# Patient Record
Sex: MALE | Race: WHITE | NOT HISPANIC OR LATINO | Employment: FULL TIME | URBAN - METROPOLITAN AREA
[De-identification: names, ages, dates, MRNs, and addresses within clinical notes are randomized per-mention and may not be internally consistent; named-entity substitution may affect disease eponyms.]

---

## 2017-05-03 ENCOUNTER — OFFICE VISIT (OUTPATIENT)
Dept: URGENT CARE | Facility: CLINIC | Age: 42
End: 2017-05-03
Payer: COMMERCIAL

## 2017-05-03 VITALS
DIASTOLIC BLOOD PRESSURE: 70 MMHG | WEIGHT: 173 LBS | HEART RATE: 78 BPM | RESPIRATION RATE: 20 BRPM | SYSTOLIC BLOOD PRESSURE: 110 MMHG | TEMPERATURE: 98.4 F | HEIGHT: 71 IN | OXYGEN SATURATION: 98 % | BODY MASS INDEX: 24.22 KG/M2

## 2017-05-03 DIAGNOSIS — J06.9 VIRAL UPPER RESPIRATORY ILLNESS: ICD-10-CM

## 2017-05-03 DIAGNOSIS — H81.12 BPPV (BENIGN PAROXYSMAL POSITIONAL VERTIGO), LEFT: ICD-10-CM

## 2017-05-03 PROCEDURE — 99203 OFFICE O/P NEW LOW 30 MIN: CPT | Performed by: FAMILY MEDICINE

## 2017-05-03 ASSESSMENT — ENCOUNTER SYMPTOMS
DOUBLE VISION: 0
FEVER: 0
COUGH: 1
BLURRED VISION: 0
HOARSE VOICE: 0
NAUSEA: 0
SWOLLEN GLANDS: 0
CHILLS: 0
NECK PAIN: 0
DIAPHORESIS: 0
SHORTNESS OF BREATH: 0
DIZZINESS: 1
HEADACHES: 0
VOMITING: 0
SORE THROAT: 1
SINUS PRESSURE: 1

## 2017-05-03 NOTE — MR AVS SNAPSHOT
"        Pj Rhodes   5/3/2017 8:15 AM   Office Visit   MRN: [id number]    Department:  [facility]   Dept Phone:  [phone]    Description:  Male : 1975   Provider:  Roberto Gold M.D.           Reason for Visit     Sinus Problem Almost a week stuffy nose, couple days loss of balance      Allergies as of 5/3/2017     No Known Allergies      You were diagnosed with     BPPV (benign paroxysmal positional vertigo), left   [1200458]       Viral upper respiratory illness   [426988]         Vital Signs     Blood Pressure Pulse Temperature Respirations Height Weight    110/70 mmHg 78 36.9 °C (98.4 °F) 20 1.803 m (5' 11\") 78.472 kg (173 lb)    Body Mass Index Oxygen Saturation Smoking Status             24.14 kg/m2 98% Never Smoker          Basic Information     Date Of Birth Sex Race Ethnicity Preferred Language    1975 Male White Non- English      Health Maintenance        Date Due Completion Dates    IMM DTaP/Tdap/Td Vaccine (2 - Td) 2018            Current Immunizations     Influenza Vaccine Quad Inj (Pf) 2013    Tdap Vaccine 2008      Below and/or attached are the medications your provider expects you to take. Review all of your home medications and newly ordered medications with your provider and/or pharmacist. Follow medication instructions as directed by your provider and/or pharmacist. Please keep your medication list with you and share with your provider. Update the information when medications are discontinued, doses are changed, or new medications (including over-the-counter products) are added; and carry medication information at all times in the event of emergency situations     Allergies:  No Known Allergies          Medications  Valid as of: May 03, 2017 -  9:25 AM    Generic Name Brand Name Tablet Size Instructions for use    .                 Medicines prescribed today were sent to:     [facility]/PHARMACY #[id number] - [place], [place] - [address]    [address] [place] [address]    Phone: [phone] Fax: [phone]    Open 24 Hours?: No      Medication refill instructions:       If your prescription bottle indicates you have medication refills left, it is not necessary to call your provider’s office. Please contact your pharmacy and they will refill your medication.    If your prescription bottle indicates you do not have any refills left, you may request refills at any time through one of the following ways: The online [employer] system (except Urgent Care), by calling your provider’s office, or by asking your pharmacy to contact your provider’s office with a refill request. Medication refills are processed only during regular business hours and may not be available until the next business day. Your provider may request additional information or to have a follow-up visit with you prior to refilling your medication.   *Please Note: Medication refills are assigned a new Rx number when refilled electronically. Your pharmacy may indicate that no refills were authorized even though a new prescription for the same medication is available at the pharmacy. Please request the medicine by name with the pharmacy before contacting your provider for a refill.        Instructions    Benign Positional Vertigo  Vertigo means you feel like you or your surroundings are moving when they are not. Benign positional vertigo is the most common form of vertigo. Benign means that the cause of your condition is not serious. Benign positional vertigo is more common in older adults.  CAUSES   Benign positional vertigo is the result of an upset in the labyrinth system. This is an area in the middle ear that helps control your balance. This may be caused by a viral infection, head injury, or repetitive motion. However, often no specific cause is found.  SYMPTOMS   Symptoms of benign positional vertigo occur when you move your head or eyes in different directions. Some of the symptoms  may include:  · Loss of balance and falls.  · Vomiting.  · Blurred vision.  · Dizziness.  · Nausea.  · Involuntary eye movements (nystagmus).  DIAGNOSIS   Benign positional vertigo is usually diagnosed by physical exam. If the specific cause of your benign positional vertigo is unknown, your caregiver may perform imaging tests, such as magnetic resonance imaging (MRI) or computed tomography (CT).  TREATMENT   Your caregiver may recommend movements or procedures to correct the benign positional vertigo. Medicines such as meclizine, benzodiazepines, and medicines for nausea may be used to treat your symptoms. In rare cases, if your symptoms are caused by certain conditions that affect the inner ear, you may need surgery.  HOME CARE INSTRUCTIONS   · Follow your caregiver's instructions.  · Move slowly. Do not make sudden body or head movements.  · Avoid driving.  · Avoid operating heavy machinery.  · Avoid performing any tasks that would be dangerous to you or others during a vertigo episode.  · Drink enough fluids to keep your urine clear or pale yellow.  SEEK IMMEDIATE MEDICAL CARE IF:   · You develop problems with walking, weakness, numbness, or using your arms, hands, or legs.  · You have difficulty speaking.  · You develop severe headaches.  · Your nausea or vomiting continues or gets worse.  · You develop visual changes.  · Your family or friends notice any behavioral changes.  · Your condition gets worse.  · You have a fever.  · You develop a stiff neck or sensitivity to light.  MAKE SURE YOU:   · Understand these instructions.  · Will watch your condition.  · Will get help right away if you are not doing well or get worse.     This information is not intended to replace advice given to you by your health care provider. Make sure you discuss any questions you have with your health care provider.     Document Released: 09/25/2007 Document Revised: 03/11/2013 Document Reviewed: 04/11/2016  MamaBear App  Patient Education ©2016 Elsevier Inc.            [employer] Access Code: [email or web address]  Expires: 6/2/2017  9:25 AM    [employer]  A secure, online tool to manage your health information     [employer]’s [employer]® is a secure, online tool that connects you to your personalized health information from the privacy of your home -- day or night - making it very easy for you to manage your healthcare. Once the activation process is completed, you can even access your medical information using the [employer] rashaun, which is available for free in the Apple Rashaun store or Google Play store.     [employer] provides the following levels of access (as shown below):   My Chart Features   [facility] Doctor [facility]  Specialists [facility]  Urgent  Care Non-[facility]  Primary Care  Doctor   Email your healthcare team securely and privately 24/7 X X X    Manage appointments: schedule your next appointment; view details of past/upcoming appointments X      Request prescription refills. X      View recent personal medical records, including lab and immunizations X X X X   View health record, including health history, allergies, medications X X X X   Read reports about your outpatient visits, procedures, consult and ER notes X X X X   See your discharge summary, which is a recap of your hospital and/or ER visit that includes your diagnosis, lab results, and care plan. X X       How to register for [employer]:  1. Go to  [email or web address].  2. Click on the Sign Up Now box, which takes you to the New Member Sign Up page. You will need to provide the following information:  a. Enter your [employer] Access Code exactly as it appears at the top of this page. (You will not need to use this code after you’ve completed the sign-up process. If you do not sign up before the expiration date, you must request a new code.)   b. Enter your date of birth.   c. Enter your home email address.   d. Click Submit, and follow the next screen’s  instructions.  3. Create a [employer] ID. This will be your [employer] login ID and cannot be changed, so think of one that is secure and easy to remember.  4. Create a [employer] password. You can change your password at any time.  5. Enter your Password Reset Question and Answer. This can be used at a later time if you forget your password.   6. Enter your e-mail address. This allows you to receive e-mail notifications when new information is available in uiu.  7. Click Sign Up. You can now view your health information.    For assistance activating your uiu account, call [phone]

## 2017-05-03 NOTE — PATIENT INSTRUCTIONS
Benign Positional Vertigo  Vertigo means you feel like you or your surroundings are moving when they are not. Benign positional vertigo is the most common form of vertigo. Benign means that the cause of your condition is not serious. Benign positional vertigo is more common in older adults.  CAUSES   Benign positional vertigo is the result of an upset in the labyrinth system. This is an area in the middle ear that helps control your balance. This may be caused by a viral infection, head injury, or repetitive motion. However, often no specific cause is found.  SYMPTOMS   Symptoms of benign positional vertigo occur when you move your head or eyes in different directions. Some of the symptoms may include:  · Loss of balance and falls.  · Vomiting.  · Blurred vision.  · Dizziness.  · Nausea.  · Involuntary eye movements (nystagmus).  DIAGNOSIS   Benign positional vertigo is usually diagnosed by physical exam. If the specific cause of your benign positional vertigo is unknown, your caregiver may perform imaging tests, such as magnetic resonance imaging (MRI) or computed tomography (CT).  TREATMENT   Your caregiver may recommend movements or procedures to correct the benign positional vertigo. Medicines such as meclizine, benzodiazepines, and medicines for nausea may be used to treat your symptoms. In rare cases, if your symptoms are caused by certain conditions that affect the inner ear, you may need surgery.  HOME CARE INSTRUCTIONS   · Follow your caregiver's instructions.  · Move slowly. Do not make sudden body or head movements.  · Avoid driving.  · Avoid operating heavy machinery.  · Avoid performing any tasks that would be dangerous to you or others during a vertigo episode.  · Drink enough fluids to keep your urine clear or pale yellow.  SEEK IMMEDIATE MEDICAL CARE IF:   · You develop problems with walking, weakness, numbness, or using your arms, hands, or legs.  · You have difficulty speaking.  · You develop  severe headaches.  · Your nausea or vomiting continues or gets worse.  · You develop visual changes.  · Your family or friends notice any behavioral changes.  · Your condition gets worse.  · You have a fever.  · You develop a stiff neck or sensitivity to light.  MAKE SURE YOU:   · Understand these instructions.  · Will watch your condition.  · Will get help right away if you are not doing well or get worse.     This information is not intended to replace advice given to you by your health care provider. Make sure you discuss any questions you have with your health care provider.     Document Released: 09/25/2007 Document Revised: 03/11/2013 Document Reviewed: 04/11/2016  [employer] Interactive Patient Education ©2016 Elsevier Inc.

## 2017-05-03 NOTE — PROGRESS NOTES
"Subjective:      Pj Rhodes is a 41 y.o. male who presents with Sinus Problem            Sinus Problem  This is a new problem. The current episode started in the past 7 days. The problem has been gradually worsening since onset. There has been no fever. He is experiencing no pain (mostly pressure). Associated symptoms include congestion, coughing, sinus pressure and a sore throat. Pertinent negatives include no chills, diaphoresis, ear pain, headaches, hoarse voice, neck pain, shortness of breath, sneezing or swollen glands. Treatments tried: mucinex, allegra, excedrin. The treatment provided no relief.       Review of Systems   Constitutional: Negative for fever, chills and diaphoresis.   HENT: Positive for congestion, sinus pressure and sore throat. Negative for ear pain, hoarse voice and sneezing.    Eyes: Negative for blurred vision and double vision.   Respiratory: Positive for cough. Negative for shortness of breath.    Cardiovascular: Negative for chest pain.   Gastrointestinal: Negative for nausea and vomiting.   Genitourinary: Negative for dysuria.   Musculoskeletal: Negative for neck pain.   Neurological: Positive for dizziness. Negative for headaches.     PMH:  has a past medical history of Vertigo.  MEDS: No current outpatient prescriptions on file.  ALLERGIES: No Known Allergies  SURGHX: History reviewed. No pertinent past surgical history.  SOCHX:  reports that he has never smoked. He does not have any smokeless tobacco history on file. He reports that he drinks alcohol. He reports that he does not use illicit drugs.  FH: Family history was reviewed, no pertinent findings to report       Objective:     /70 mmHg  Pulse 78  Temp(Src) 36.9 °C (98.4 °F)  Resp 20  Ht 1.803 m (5' 11\")  Wt 78.472 kg (173 lb)  BMI 24.14 kg/m2  SpO2 98%     Physical Exam   Constitutional: He appears well-developed.   HENT:   Head: Normocephalic.   Right Ear: External ear normal.   Left Ear: External ear " normal.   Mouth/Throat: Oropharyngeal exudate present.   Nasal congestion    Eyes: Right eye exhibits no discharge. Left eye exhibits no discharge.   Neck: Normal range of motion. No tracheal deviation present. No thyromegaly present.   Cardiovascular: Normal rate.  Exam reveals no friction rub.    No murmur heard.  Pulmonary/Chest: Effort normal. No stridor. No respiratory distress. He has no wheezes.   Abdominal: Soft. He exhibits no distension. There is no tenderness. There is no guarding.   Lymphadenopathy:     He has no cervical adenopathy.   Neurological: He is alert. He displays no tremor. He exhibits normal muscle tone. He displays a negative Romberg sign. He displays no seizure activity. Coordination and gait normal. GCS eye subscore is 4. GCS verbal subscore is 5. GCS motor subscore is 6.   POSITIVE Tamiment-Hallpike to LEFT   Skin: Skin is warm and dry. He is not diaphoretic.   Psychiatric: He has a normal mood and affect. His behavior is normal.               Assessment/Plan:     1. BPPV (benign paroxysmal positional vertigo), left     2. Viral upper respiratory illness       Supportive care  Push fluids  Monitor temperature  Follow-up if symptoms worsen or fail to improve    BPPV exercised discussed and handout provided  Offered meclizine, but patient declined at this time saying it hasn't worked for him

## 2017-05-22 ENCOUNTER — OFFICE VISIT (OUTPATIENT)
Dept: MEDICAL GROUP | Facility: MEDICAL CENTER | Age: 42
End: 2017-05-22
Payer: COMMERCIAL

## 2017-05-22 VITALS
WEIGHT: 177 LBS | OXYGEN SATURATION: 95 % | HEART RATE: 62 BPM | HEIGHT: 71 IN | SYSTOLIC BLOOD PRESSURE: 124 MMHG | TEMPERATURE: 98.9 F | DIASTOLIC BLOOD PRESSURE: 70 MMHG | BODY MASS INDEX: 24.78 KG/M2

## 2017-05-22 DIAGNOSIS — G43.109 MIGRAINE WITH AURA AND WITHOUT STATUS MIGRAINOSUS, NOT INTRACTABLE: ICD-10-CM

## 2017-05-22 DIAGNOSIS — Z00.00 ROUTINE GENERAL MEDICAL EXAMINATION AT A HEALTH CARE FACILITY: ICD-10-CM

## 2017-05-22 DIAGNOSIS — R35.1 NOCTURIA: ICD-10-CM

## 2017-05-22 DIAGNOSIS — R05.9 COUGH: ICD-10-CM

## 2017-05-22 PROCEDURE — 99204 OFFICE O/P NEW MOD 45 MIN: CPT | Performed by: FAMILY MEDICINE

## 2017-05-22 ASSESSMENT — PATIENT HEALTH QUESTIONNAIRE - PHQ9: CLINICAL INTERPRETATION OF PHQ2 SCORE: 0

## 2017-05-22 NOTE — MR AVS SNAPSHOT
"        Pj DUNBAR Meagan   2017 3:40 PM   Office Visit   MRN: [id number]    Department:  [place] [address]   Dept Phone:  [phone]    Description:  Male : 1975   Provider:  Dejuan Trujillo M.D.           Reason for Visit     Establish Care     Allergic Reaction cough, sore throat     Dizziness 1 month       Allergies as of 2017     Allergen Noted Reactions    Contrast Media With Iodine [Iodine] 2017   Swelling      You were diagnosed with     Migraine with aura and without status migrainosus, not intractable   [968229]       Nocturia   [788.43.ICD-9-CM]       Routine general medical examination at a health care facility   [V70.0.ICD-9-CM]         Vital Signs     Blood Pressure Pulse Temperature Height Weight Body Mass Index    124/70 mmHg 62 37.2 °C (98.9 °F) 1.803 m (5' 11\") 80.287 kg (177 lb) 24.70 kg/m2    Oxygen Saturation Smoking Status                95% Never Smoker           Basic Information     Date Of Birth Sex Race Ethnicity Preferred Language    1975 Male White Non- English      Your appointments     2017  7:00 AM   Established Patient with Dejuan Trujillo M.D.   [facility]    [address]  [address]  [facility] [address] [phone]           You will be receiving a confirmation call a few days before your appointment from our automated call confirmation system.              Problem List              ICD-10-CM Priority Class Noted - Resolved    Migraine with aura and without status migrainosus, not intractable G43.109   2017 - Present    Nocturia R35.1   2017 - Present      Health Maintenance        Date Due Completion Dates    IMM DTaP/Tdap/Td Vaccine (2 - Td) 2018            Current Immunizations     Influenza Vaccine Quad Inj (Pf) 2013    Tdap Vaccine 2008      Below and/or attached are the medications your provider expects you to take. Review all of your home " medications and newly ordered medications with your provider and/or pharmacist. Follow medication instructions as directed by your provider and/or pharmacist. Please keep your medication list with you and share with your provider. Update the information when medications are discontinued, doses are changed, or new medications (including over-the-counter products) are added; and carry medication information at all times in the event of emergency situations     Allergies:  CONTRAST MEDIA WITH IODINE - Swelling               Medications  Valid as of: May 22, 2017 -  4:32 PM    Generic Name Brand Name Tablet Size Instructions for use    .                 Medicines prescribed today were sent to:     [facility]/PHARMACY #[id number] - [place], [place] - [address]    [address] [place] [address]    Phone: [phone] Fax: [phone]    Open 24 Hours?: No      Medication refill instructions:       If your prescription bottle indicates you have medication refills left, it is not necessary to call your provider’s office. Please contact your pharmacy and they will refill your medication.    If your prescription bottle indicates you do not have any refills left, you may request refills at any time through one of the following ways: The online [employer] system (except Urgent Care), by calling your provider’s office, or by asking your pharmacy to contact your provider’s office with a refill request. Medication refills are processed only during regular business hours and may not be available until the next business day. Your provider may request additional information or to have a follow-up visit with you prior to refilling your medication.   *Please Note: Medication refills are assigned a new Rx number when refilled electronically. Your pharmacy may indicate that no refills were authorized even though a new prescription for the same medication is available at the pharmacy. Please request the medicine by name with the pharmacy before  contacting your provider for a refill.        Your To Do List     Future Labs/Procedures Complete By Expires    COMP METABOLIC PANEL  As directed 5/22/2018    HEMOGLOBIN A1C  As directed 5/22/2018    LIPID PROFILE  As directed 5/22/2018    Comments:    Fasting at least 8hours    PROSTATE SPECIFIC AG SCREENING  As directed 5/22/2018         [employer] Access Code: [email or web address]  Expires: 6/2/2017  9:25 AM    [employer]  A secure, online tool to manage your health information     [employer]’s [employer]® is a secure, online tool that connects you to your personalized health information from the privacy of your home -- day or night - making it very easy for you to manage your healthcare. Once the activation process is completed, you can even access your medical information using the [employer] rashaun, which is available for free in the Apple Rashaun store or Google Play store.     [employer] provides the following levels of access (as shown below):   My Chart Features   [facility] Doctor [facility]  Specialists [facility]  Urgent  Care [facility]  Primary Care  Doctor   Email your healthcare team securely and privately 24/7 X X X    Manage appointments: schedule your next appointment; view details of past/upcoming appointments X      Request prescription refills. X      View recent personal medical records, including lab and immunizations X X X X   View health record, including health history, allergies, medications X X X X   Read reports about your outpatient visits, procedures, consult and ER notes X X X X   See your discharge summary, which is a recap of your hospital and/or ER visit that includes your diagnosis, lab results, and care plan. X X       How to register for [employer]:  1. Go to  [email or web address].  2. Click on the Sign Up Now box, which takes you to the New Member Sign Up page. You will need to provide the following information:  a. Enter your [employer] Access Code exactly as it appears at the top of this page.  (You will not need to use this code after you’ve completed the sign-up process. If you do not sign up before the expiration date, you must request a new code.)   b. Enter your date of birth.   c. Enter your home email address.   d. Click Submit, and follow the next screen’s instructions.  3. Create a [employer] ID. This will be your [employer] login ID and cannot be changed, so think of one that is secure and easy to remember.  4. Create a [employer] password. You can change your password at any time.  5. Enter your Password Reset Question and Answer. This can be used at a later time if you forget your password.   6. Enter your e-mail address. This allows you to receive e-mail notifications when new information is available in [employer].  7. Click Sign Up. You can now view your health information.    For assistance activating your [employer] account, call [phone]

## 2017-05-23 PROBLEM — R05.9 COUGH: Status: ACTIVE | Noted: 2017-05-23

## 2017-05-24 ENCOUNTER — HOSPITAL ENCOUNTER (OUTPATIENT)
Dept: LAB | Facility: MEDICAL CENTER | Age: 42
End: 2017-05-24
Attending: FAMILY MEDICINE
Payer: COMMERCIAL

## 2017-05-24 DIAGNOSIS — R35.1 NOCTURIA: ICD-10-CM

## 2017-05-24 DIAGNOSIS — Z00.00 ROUTINE GENERAL MEDICAL EXAMINATION AT A HEALTH CARE FACILITY: ICD-10-CM

## 2017-05-24 LAB
ALBUMIN SERPL BCP-MCNC: 4.2 G/DL (ref 3.2–4.9)
ALBUMIN/GLOB SERPL: 1.6 G/DL
ALP SERPL-CCNC: 41 U/L (ref 30–99)
ALT SERPL-CCNC: 14 U/L (ref 2–50)
ANION GAP SERPL CALC-SCNC: 7 MMOL/L (ref 0–11.9)
AST SERPL-CCNC: 17 U/L (ref 12–45)
BILIRUB SERPL-MCNC: 0.7 MG/DL (ref 0.1–1.5)
BUN SERPL-MCNC: 18 MG/DL (ref 8–22)
CALCIUM SERPL-MCNC: 9.3 MG/DL (ref 8.5–10.5)
CHLORIDE SERPL-SCNC: 106 MMOL/L (ref 96–112)
CHOLEST SERPL-MCNC: 179 MG/DL (ref 100–199)
CO2 SERPL-SCNC: 25 MMOL/L (ref 20–33)
CREAT SERPL-MCNC: 1.13 MG/DL (ref 0.5–1.4)
EST. AVERAGE GLUCOSE BLD GHB EST-MCNC: 100 MG/DL
GFR SERPL CREATININE-BSD FRML MDRD: >60 ML/MIN/1.73 M 2
GLOBULIN SER CALC-MCNC: 2.6 G/DL (ref 1.9–3.5)
GLUCOSE SERPL-MCNC: 85 MG/DL (ref 65–99)
HBA1C MFR BLD: 5.1 % (ref 0–5.6)
HDLC SERPL-MCNC: 39 MG/DL
LDLC SERPL CALC-MCNC: 122 MG/DL
POTASSIUM SERPL-SCNC: 4.2 MMOL/L (ref 3.6–5.5)
PROT SERPL-MCNC: 6.8 G/DL (ref 6–8.2)
PSA SERPL-MCNC: 0.63 NG/ML (ref 0–4)
SODIUM SERPL-SCNC: 138 MMOL/L (ref 135–145)
TRIGL SERPL-MCNC: 89 MG/DL (ref 0–149)

## 2017-05-24 PROCEDURE — 80053 COMPREHEN METABOLIC PANEL: CPT

## 2017-05-24 PROCEDURE — 80061 LIPID PANEL: CPT

## 2017-05-24 PROCEDURE — 83036 HEMOGLOBIN GLYCOSYLATED A1C: CPT

## 2017-05-24 PROCEDURE — 36415 COLL VENOUS BLD VENIPUNCTURE: CPT

## 2017-05-24 PROCEDURE — 84153 ASSAY OF PSA TOTAL: CPT

## 2017-05-24 NOTE — ASSESSMENT & PLAN NOTE
"Patient states that he infrequently gets migraines with auras.  Migraines are preceded by auras, then proceed to unilateral (or sometimes bilateral) pounding headache that \"makes me want to curl up in bed, turn off the lights, and sleep it off.\"  Patient states that this occurs once per year, and that he hasn't tried medication like Imitrex in the past.    ROS is NEGATIVE for confusion, altered mentation, word finding difficulty, memory loss, facial droop, dysarthria, dysphagia, hemiplegia, gait instability.  "

## 2017-05-24 NOTE — ASSESSMENT & PLAN NOTE
"Patient states that he has a cough that he describes as a \"stinger\" in his throat, otherwise is negative for any other s/sx of illness or allergies.    ROS is NEGATIVE for fevers, chills, bilateral ear congestion/pain, sinus headaches, tender cervical lymph nodes, dysphagia, tachypnea, dyspnea, chest congestion, wheezing/rhonchi/rales, nausea, emesis, loose stools/diarrhea, myalgias, rash.    ROS is NEGATIVE  for increased lacrimation, b/l itchy eyes,  rhinorrhea, post-nasal drip, sneezing, wheezing, dyspnea, respiratory distress, palpitations, tachycardia, rash, generalized pruritis, rash/hives.  "

## 2017-05-24 NOTE — ASSESSMENT & PLAN NOTE
Patient states that he has nocturia that occurs once to twice nightly.  Otherwise, no other concerning  s/sx.    No FMH positive for prostate cancer    ROS is NEGATIVE for polyuria, increased frequency of urination, urinary hesitancy, feeling of incomplete voiding, difficulty initiation urine stream, hematuria, pyuria, erectile dysfunction

## 2017-05-24 NOTE — PROGRESS NOTES
"Subjective:     Chief Complaint   Patient presents with   • Establish Care   • Allergic Reaction     cough, sore throat    • Dizziness     1 month        History of Present Illness:  Pj Rhodes is a 41 y.o. male patient new to [facility] who presents today to establish primary medical care as well as discuss cough.  Dizziness has resolved.  PMH, PSH, Social History, Medications, Allergies, FMH all reviewed as documented:    Migraine with aura and without status migrainosus, not intractable  Patient states that he infrequently gets migraines with auras.  Migraines are preceded by auras, then proceed to unilateral (or sometimes bilateral) pounding headache that \"makes me want to curl up in bed, turn off the lights, and sleep it off.\"  Patient states that this occurs once per year, and that he hasn't tried medication like Imitrex in the past.    ROS is NEGATIVE for confusion, altered mentation, word finding difficulty, memory loss, facial droop, dysarthria, dysphagia, hemiplegia, gait instability.    Nocturia  Patient states that he has nocturia that occurs once to twice nightly.  Otherwise, no other concerning  s/sx.    No FMH positive for prostate cancer    ROS is NEGATIVE for polyuria, increased frequency of urination, urinary hesitancy, feeling of incomplete voiding, difficulty initiation urine stream, hematuria, pyuria, erectile dysfunction    Cough  Patient states that he has a cough that he describes as a \"stinger\" in his throat, otherwise is negative for any other s/sx of illness or allergies.    ROS is NEGATIVE for fevers, chills, bilateral ear congestion/pain, sinus headaches, tender cervical lymph nodes, dysphagia, tachypnea, dyspnea, chest congestion, wheezing/rhonchi/rales, nausea, emesis, loose stools/diarrhea, myalgias, rash.    ROS is NEGATIVE  for increased lacrimation, b/l itchy eyes,  rhinorrhea, post-nasal drip, sneezing, wheezing, dyspnea, respiratory distress, palpitations, tachycardia, rash, " generalized pruritis, rash/hives.      Patient Active Problem List    Diagnosis Date Noted   • Cough 2017   • Migraine with aura and without status migrainosus, not intractable 2017   • Nocturia 2017       Additional History:   Allergies:    Contrast media with iodine     Medications:     No current [facility]-ordered outpatient prescriptions on file.     No current [facility]-ordered facility-administered medications on file.        Past Medical History:     Past Medical History   Diagnosis Date   • Vertigo         Past Surgical History:     Past Surgical History   Procedure Laterality Date   • Eye surgery Left      Lasik   • Vasectomy  ?        Social History:     Social History   Substance Use Topics   • Smoking status: Never Smoker    • Smokeless tobacco: Never Used   • Alcohol Use: 2.4 oz/week     4 Cans of beer per week        Family History:     Family Status   Relation Status Death Age   • Mother Alive    • Father Alive    • Sister Alive    • Paternal Grandfather     • Maternal Grandmother     • Maternal Grandfather     • Paternal Grandmother          Family History   Problem Relation Age of Onset   • No Known Problems Mother    • No Known Problems Father    • No Known Problems Sister    • Dementia Paternal Grandfather    • Cancer Neg Hx        ROS:    - Constitutional: Negative for fever, chills, unexpected weight change, and fatigue/generalized weakness.     - HEENT: Negative for headaches, vision changes, hearing changes, ear pain, ear discharge, rhinorrhea, sinus congestion, sore throat, and neck pain.      - Respiratory: Negative for cough, sputum production, chest congestion, dyspnea, wheezing, and crackles.      - Cardiovascular: Negative for chest pain, palpitations, orthopnea, and bilateral lower extremity edema.     - Musculoskeletal: Negative for myalgias, back pain, and joint pain.     - Neurological: Negative for dizziness, tingling, tremors, focal  "sensory deficit, focal weakness and headaches.     - NOTE: All other systems reviewed and are negative, except as in HPI.     Objective:   Physical Exam:    Vitals: Blood pressure 124/70, pulse 62, temperature 37.2 °C (98.9 °F), height 1.803 m (5' 11\"), weight 80.287 kg (177 lb), SpO2 95 %.   BMI: Body mass index is 24.7 kg/(m^2).   General/Constitutional: Vitals as above, Well nourished, well developed male in no acute distress   Head/Eyes:  - Head is grossly normal & atraumatic  - Bilateral conjunctivae clear and not injected, bilateral EOMI, bilateral PERRL   ENT:   - Bilateral external ears grossly normal in appearance, external auditory canals clear & bilateral TMs visualized with appropriate cone of light reflex, hearing grossly intact  - External nares normal in appearance and without discharge/bleeding, bilateral turbinates non-erythematous/non-edematous and without discharge/bleeding  - Good dentition,  posterior oropharynx without erythema/edema/exudates  Neck: Neck supple, no masses, neck non-tender to palpation, no thyromegaly/goiter   Respiratory: No respiratory distress, bilateral lungs are clear to auscultation in all lung fields (anterior/lateral/posterior), no wheezing/rhonchi/rales   Cardiovascular: Regular rate and rhythm without murmur/gallops/rubs, distal pulses equal and 2+ bilaterally (radial, posterior tibial), no bilateral lower extremity edema   MSK: Gait grossly normal & not antalgic, no tenderness to percussion of vertebral processes, no CVAT, no bilateral SI joint tenderness   Integumentary: No apparent rashes   Neuro: Gross motor movement intact in all 4 extremities, Gross sensation intact to extremities and trunk, Gait grossly normal and not antalgic   Psych: Judgment grossly appropriate, no apparent depression/anxiety      Health Maintenance:     - I have requested previous records, and will update accordingly.    Imaging/Labs:     - I have requested previous records, and will update " accordingly.     Assessment and Plan:   1. Migraine with aura and without status migrainosus, not intractable  Stable, well-controlled.  Patient given information re: Imitrex.  Patient to consider having rx for Imitrex.    2. Nocturia  Uncontrolled.  PSA for screening.  Consider [facility] vs. Urology Referral vs. Watchful waiting.   - PROSTATE SPECIFIC AG SCREENING; Future    3. Cough  Uncontrolled but resolving.  Patient to keep a journal of his symptoms.    4. Routine general medical examination at a health care facility   - PROSTATE SPECIFIC AG SCREENING; Future   - HEMOGLOBIN A1C; Future   - LIPID PROFILE; Future   - COMP METABOLIC PANEL; Future      RTC: 2 weeks for labs follow-up.    PLEASE NOTE: This dictation was created using voice recognition software. I have made every reasonable attempt to correct obvious errors, but I expect that there are errors of grammar and possibly content that I did not discover before finalizing the note.

## 2017-06-07 ENCOUNTER — APPOINTMENT (OUTPATIENT)
Dept: MEDICAL GROUP | Facility: MEDICAL CENTER | Age: 42
End: 2017-06-07
Payer: COMMERCIAL

## 2017-06-14 ENCOUNTER — TELEPHONE (OUTPATIENT)
Dept: MEDICAL GROUP | Facility: MEDICAL CENTER | Age: 42
End: 2017-06-14

## 2017-06-14 NOTE — TELEPHONE ENCOUNTER
ESTABLISHED PATIENT PRE-VISIT PLANNING     Note: Patient will not be contacted if there is no indication to call.     1.  Reviewed notes from the last few office visits within the medical group: Yes    2.  If any orders were placed at last visit or intended to be done for this visit (i.e. 6 mos follow-up), do we have Results/Consult Notes?        •  Labs - Labs ordered, completed and results are in chart.       •  Imaging - Imaging was not ordered at last office visit.       •  Referrals - No referrals were ordered at last office visit.    3. Is this appointment scheduled as a Hospital Follow-Up? No    4.  Immunizations were updated in [employer] using WebIZ?: Yes       •  Web Iz Recommendations: FLU HEPATITIS A  MMR  TD VARICELLA (Chicken Pox)     5.  Patient is due for the following Health Maintenance Topics:   There are no preventive care reminders to display for this patient.        6.  Patient was NOT informed to arrive 15 min prior to their scheduled appointment and bring in their medication bottles.

## 2017-06-15 ENCOUNTER — OFFICE VISIT (OUTPATIENT)
Dept: MEDICAL GROUP | Facility: MEDICAL CENTER | Age: 42
End: 2017-06-15
Payer: COMMERCIAL

## 2017-06-15 VITALS
BODY MASS INDEX: 24.22 KG/M2 | HEART RATE: 73 BPM | HEIGHT: 71 IN | OXYGEN SATURATION: 96 % | DIASTOLIC BLOOD PRESSURE: 62 MMHG | TEMPERATURE: 98.4 F | WEIGHT: 173 LBS | SYSTOLIC BLOOD PRESSURE: 108 MMHG

## 2017-06-15 DIAGNOSIS — E78.2 MIXED DYSLIPIDEMIA: ICD-10-CM

## 2017-06-15 DIAGNOSIS — G43.109 MIGRAINE WITH AURA AND WITHOUT STATUS MIGRAINOSUS, NOT INTRACTABLE: ICD-10-CM

## 2017-06-15 PROBLEM — R05.9 COUGH: Status: RESOLVED | Noted: 2017-05-23 | Resolved: 2017-06-15

## 2017-06-15 PROCEDURE — 99214 OFFICE O/P EST MOD 30 MIN: CPT | Performed by: FAMILY MEDICINE

## 2017-06-15 RX ORDER — SUMATRIPTAN 50 MG/1
50 TABLET, FILM COATED ORAL
Qty: 10 TAB | Refills: 3 | Status: SHIPPED | OUTPATIENT
Start: 2017-06-15 | End: 2018-12-17 | Stop reason: SDUPTHER

## 2017-06-15 NOTE — ASSESSMENT & PLAN NOTE
Patient and I discussed recent labs (see below) and that 10year ASCVD risk based on most recent lipid panel, current blood pressure (non-hypertensive, without medication), diabetes status (non-diabetic), and smoking status (non-smoker) is: calculated risk 0.9%, risk with optimal risk factors 0.5% (HDL >60, TChol 170, non-hypertensive, non-smoker, non-diabetic).    Patient and I then discussed necessary dietary changes to make to address dyslipidemia.  Patient verbalized understanding.    ROS is NEGATIVE for dizziness, generalized weakness/fatigue, vision/hearing changes, jaw pain/paresthesias, BUE pain/paresthesias/numbness/weakness, chest pain/pressure, palpitations, dyspnea, RUQ abdominal pain, oliguria/anuria, BLE edema.

## 2017-06-15 NOTE — PATIENT INSTRUCTIONS
"Dr. Trujillo's tips for \"Lifestyle Medicine:\"     Check out the talk/documentary on \"How not to die\" by Dr. Thanh Guaman (on his website nutritionfacts.org, he also authored a book with this title).       1) Make SMART lifestyle changes: Specific, Measurable, Attainable, Relevant, Time-sensitive.  The lifestyle changes that you need to make are with regards to: nutrition, cardiovascular exercise, sleep, stress management.  Make these changes every 2 weeks, revisiting the previous goals and perhaps revising them and/or setting new ones.       2) Nutrition: Make as many changes as you can to increase the amount of whole-foods (not Whole Foods, necessarily!  ;-)), plant-based diet as possible:   A) Books: Eat to Live (Dr. Carlos Munoz), The Spectrum (Dr. Jerson Johnson), The Starch Solution (Dr. Amador Maynard)      B) Documentaries (can usually be found on [employer]): [place] Over Knives.  Fat, Sick, and Nearly Dead.  Fed Up.           3) Cardiovascular Exercise: The center for disease control recommends a minimum of 150 minutes per week of moderate intensity cardiovascular exercise for weight maintenance and cardiovascular health.  Set this as your initial goal, with at least 30 minutes per session. Types of exercise can include 30 minutes of elliptical, 30 minutes of decently fast jog, 30 minutes of swimming, 30 minutes of heavy gardening (lifting big bags of fertilizer, digging deep holes/ditches).  He can cut down the minute requirements to half, by doing higher intensity sports such as a game of tennis, or soccer.  He notes the library and check out with they have for home exercise programs, as well.       4) Sleep:    A) Goal: Obtain a minimum of 7-8hours of continuous, uninterrupted, restful sleep per night.    B) Tips for Sleep Hygiene:    I) Go to bed and wake up at consistent times whether work/school day or not.     II) Keep room dark, quiet, and comfortable.  Increase exposure to sunlight during awake times and " avoid bright lights (especially anything with a backlight) at least the last 1-2hours before going to sleep.     III) Don't nap.     IV) Avoid stimulant or caffeine use more than 4 hours after wake time.        5) Stress Management: You cannot change the stresses of life dizziness necessarily, but you can change how he responds of them. One good way to manage stress is to write things down in order to help you process how to approach things in general or specifically. Another good way is to talk it out with someone you trusts, specifically your significant other or good friend. A definite great way to deal with stress is to have cardiovascular exercise!

## 2017-06-15 NOTE — MR AVS SNAPSHOT
"        Pj DUNBAR Meagan   6/15/2017 7:40 AM   Office Visit   MRN: [id number]    Department:  [place] [address]   Dept Phone:  [phone]    Description:  Male : 1975   Provider:  Dejuan Trujillo M.D.           Reason for Visit     Results labs      Allergies as of 6/15/2017     Allergen Noted Reactions    Contrast Media With Iodine [Iodine] 2017   Swelling      You were diagnosed with     Mixed dyslipidemia   [297016]       Migraine with aura and without status migrainosus, not intractable   [624323]         Vital Signs     Blood Pressure Pulse Temperature Height Weight Body Mass Index    108/62 mmHg 73 36.9 °C (98.4 °F) 1.803 m (5' 10.98\") 78.472 kg (173 lb) 24.14 kg/m2    Oxygen Saturation Smoking Status                96% Never Smoker           Basic Information     Date Of Birth Sex Race Ethnicity Preferred Language    1975 Male White Non- English      Your appointments     Oct 26, 2017  7:20 AM   Established Patient with Dejuan Trujillo M.D.   [facility]    [address]  [address]  [facility] [id number] [phone]           You will be receiving a confirmation call a few days before your appointment from our automated call confirmation system.              Problem List              ICD-10-CM Priority Class Noted - Resolved    Migraine with aura and without status migrainosus, not intractable G43.109   2017 - Present    Nocturia R35.1   2017 - Present    Mixed dyslipidemia E78.2   6/15/2017 - Present      Health Maintenance        Date Due Completion Dates    IMM DTaP/Tdap/Td Vaccine (2 - Td) 2018            Current Immunizations     Influenza Vaccine Quad Inj (Pf) 2013    Tdap Vaccine 2008      Below and/or attached are the medications your provider expects you to take. Review all of your home medications and newly ordered medications with your provider and/or pharmacist. Follow medication " instructions as directed by your provider and/or pharmacist. Please keep your medication list with you and share with your provider. Update the information when medications are discontinued, doses are changed, or new medications (including over-the-counter products) are added; and carry medication information at all times in the event of emergency situations     Allergies:  CONTRAST MEDIA WITH IODINE - Swelling               Medications  Valid as of: Mary Kay 15, 2017 -  8:21 AM    Generic Name Brand Name Tablet Size Instructions for use    SUMAtriptan Succinate (Tab) IMITREX 50 MG Take 1 Tab by mouth Once PRN for Migraine for up to 1 dose. Can take second dose if no resolution after 2 hours.        .                 Medicines prescribed today were sent to:     [facility][id number] - [place], [place] - [address]    [address] [place] [address]    Phone: [phone] Fax: [phone]    Open 24 Hours?: No      Medication refill instructions:       If your prescription bottle indicates you have medication refills left, it is not necessary to call your provider’s office. Please contact your pharmacy and they will refill your medication.    If your prescription bottle indicates you do not have any refills left, you may request refills at any time through one of the following ways: The online [employer] system (except Urgent Care), by calling your provider’s office, or by asking your pharmacy to contact your provider’s office with a refill request. Medication refills are processed only during regular business hours and may not be available until the next business day. Your provider may request additional information or to have a follow-up visit with you prior to refilling your medication.   *Please Note: Medication refills are assigned a new Rx number when refilled electronically. Your pharmacy may indicate that no refills were authorized even though a new prescription for the same medication is available at the  "pharmacy. Please request the medicine by name with the pharmacy before contacting your provider for a refill.        Your To Do List     Future Labs/Procedures Complete By Expires    LIPID PROFILE  10/2/2017 (Approximate) 6/15/2018    Comments:    Fasting at least 8hours      Instructions    Dr. Trujillo's tips for \"Lifestyle Medicine:\"     Check out the talk/documentary on \"How not to die\" by Dr. Thanh Guaman (on his website nutritionfacts.org, he also authored a book with this title).       1) Make SMART lifestyle changes: Specific, Measurable, Attainable, Relevant, Time-sensitive.  The lifestyle changes that you need to make are with regards to: nutrition, cardiovascular exercise, sleep, stress management.  Make these changes every 2 weeks, revisiting the previous goals and perhaps revising them and/or setting new ones.       2) Nutrition: Make as many changes as you can to increase the amount of whole-foods (not Whole Foods, necessarily!  ;-)), plant-based diet as possible:   A) Books: Eat to Live (Dr. Carlos Munoz), The Spectrum (Dr. Jerson Johnson), The Starch Solution (Dr. Amador Maynard)      B) Documentaries (can usually be found on [employer]): [place] Over Knives.  Fat, Sick, and Nearly Dead.  Fed Up.           3) Cardiovascular Exercise: The center for disease control recommends a minimum of 150 minutes per week of moderate intensity cardiovascular exercise for weight maintenance and cardiovascular health.  Set this as your initial goal, with at least 30 minutes per session. Types of exercise can include 30 minutes of elliptical, 30 minutes of decently fast jog, 30 minutes of swimming, 30 minutes of heavy gardening (lifting big bags of fertilizer, digging deep holes/ditches).  He can cut down the minute requirements to half, by doing higher intensity sports such as a game of tennis, or soccer.  He notes the library and check out with they have for home exercise programs, as well.       4) Sleep:    A) Goal: Obtain " a minimum of 7-8hours of continuous, uninterrupted, restful sleep per night.    B) Tips for Sleep Hygiene:    I) Go to bed and wake up at consistent times whether work/school day or not.     II) Keep room dark, quiet, and comfortable.  Increase exposure to sunlight during awake times and avoid bright lights (especially anything with a backlight) at least the last 1-2hours before going to sleep.     III) Don't nap.     IV) Avoid stimulant or caffeine use more than 4 hours after wake time.        5) Stress Management: You cannot change the stresses of life dizziness necessarily, but you can change how he responds of them. One good way to manage stress is to write things down in order to help you process how to approach things in general or specifically. Another good way is to talk it out with someone you trusts, specifically your significant other or good friend. A definite great way to deal with stress is to have cardiovascular exercise!          Trellie Access Code: Activation code not generated  Current Trellie Status: Active

## 2017-06-15 NOTE — ASSESSMENT & PLAN NOTE
"Patient has had one episode of Migraines with aura within the last month.  Patient describes migraines as pounding headaches with aura as dysarthria which is mild, he is able to speak but \"the words may not make sense to other people.\"     Patient is not taking any medications at present.    ROS is NEGATIVE for confusion, altered mentation, word finding difficulty, memory loss, facial droop, dysphagia, hemiplegia, gait instability.  "

## 2017-06-15 NOTE — PROGRESS NOTES
"Subjective:     Chief Complaint   Patient presents with   • Results     labs       History of Present Illness:  Pj Rhodes is a 41 y.o. male established patient who presents today to review labs as well as discuss migraine management:    Mixed dyslipidemia  Patient and I discussed recent labs (see below) and that 10year ASCVD risk based on most recent lipid panel, current blood pressure (non-hypertensive, without medication), diabetes status (non-diabetic), and smoking status (non-smoker) is: calculated risk 0.9%, risk with optimal risk factors 0.5% (HDL >60, TChol 170, non-hypertensive, non-smoker, non-diabetic).    Patient and I then discussed necessary dietary changes to make to address dyslipidemia.  Patient verbalized understanding.    ROS is NEGATIVE for dizziness, generalized weakness/fatigue, vision/hearing changes, jaw pain/paresthesias, BUE pain/paresthesias/numbness/weakness, chest pain/pressure, palpitations, dyspnea, RUQ abdominal pain, oliguria/anuria, BLE edema.    Migraine with aura and without status migrainosus, not intractable  Patient has had one episode of Migraines with aura within the last month.  Patient describes migraines as pounding headaches with aura as dysarthria which is mild, he is able to speak but \"the words may not make sense to other people.\"     Patient is not taking any medications at present.    ROS is NEGATIVE for confusion, altered mentation, word finding difficulty, memory loss, facial droop, dysphagia, hemiplegia, gait instability.      Patient Active Problem List    Diagnosis Date Noted   • Mixed dyslipidemia 06/15/2017   • Migraine with aura and without status migrainosus, not intractable 05/22/2017   • Nocturia 05/22/2017       Additional History:   Allergies:    Contrast media with iodine     Current Medications:     Current Outpatient Prescriptions   Medication Sig Dispense Refill   • sumatriptan (IMITREX) 50 MG Tab Take 1 Tab by mouth Once PRN for Migraine for up " "to 1 dose. Can take second dose if no resolution after 2 hours. 10 Tab 3     No current facility-administered medications for this visit.        Social History:     Social History   Substance Use Topics   • Smoking status: Never Smoker    • Smokeless tobacco: Never Used   • Alcohol Use: 2.4 oz/week     4 Cans of beer per week       ROS:     - NOTE: All other systems reviewed and are negative, except as in HPI.     Objective:   Physical Exam:    Vitals: Blood pressure 108/62, pulse 73, temperature 36.9 °C (98.4 °F), height 1.803 m (5' 10.98\"), weight 78.472 kg (173 lb), SpO2 96 %.   BMI: Body mass index is 24.14 kg/(m^2).   General/Constitutional: Vitals as above, Well nourished, well developed male in no acute distress   Head/Eyes: Head is grossly normal & atraumatic, bilateral conjunctivae clear and not injected, bilateral EOMI, bilateral PERRL   ENT: Bilateral external ears grossly normal in appearance, Hearing grossly intact, External nares normal in appearance and without discharge/bleeding   Respiratory: No respiratory distress, bilateral lungs are clear to ausculation in all lung fields (anterior/lateral/posterior), no wheezing/rhonchi/rales   Cardiovascular: Regular rate and rhythm without murmur/gallops/rubs, distal pulses are intact and equal bilaterally (radial, posterior tibial), no bilateral lower extremity edema   MSK: Gait grossly normal & not antalgic   Integumentary: No apparent rashes   Psych: Judgment grossly appropriate, no apparent depression/anxiety    Health Maintenance:      - uptodate    Imaging/Labs:      - 05/24/17 -- HDL 39, [id number] --> mixed dyslipidemia     - 05/24/17 -- CMP WNL, A1c WNL, PSA WNL --> hepatic and renal function within normal limits, patient not prediabetic nondiabetic, and patient without BPH     Assessment and Plan:   1. Mixed dyslipidemia  Uncontrolled.  Patient and I discussed the importance of lifestyle changes, with particular emphasis on plant-based nutrition (for " the purposes of weight loss, general health, DLD, Migraines), as well as cardiovascular exercise, proper sleep, and stress management.  This discussion is briefly summarized in the patient instruction section below.  Patient verbalized understanding.   - LIPID PROFILE; Future    2. Migraine with aura and without status migrainosus, not intractable  Uncontrolled, occurring once per month or less. Trial of Imitrex, as well as elimination diet, particular emphasis on the limiting dairy and other fermented foods.   - sumatriptan (IMITREX) 50 MG Tab; Take 1 Tab by mouth Once PRN for Migraine for up to 1 dose. Can take second dose if no resolution after 2 hours.  Dispense: 10 Tab; Refill: 3      RTC: in 4 months for annual medical exam.    PLEASE NOTE: This dictation was created using voice recognition software. I have made every reasonable attempt to correct obvious errors, but I expect that there are errors of grammar and possibly content that I did not discover before finalizing the note.

## 2017-07-17 ENCOUNTER — TELEPHONE (OUTPATIENT)
Dept: MEDICAL GROUP | Facility: MEDICAL CENTER | Age: 42
End: 2017-07-17

## 2017-07-17 NOTE — TELEPHONE ENCOUNTER
Phone Number Called: [phone]     Message: pt left a Vm requesting to get an order for MRI. I called the pt to get more informations but no answer.    Left Message for patient to call back: yes

## 2017-07-18 NOTE — TELEPHONE ENCOUNTER
Patient is making a clinic visit to have evaluation. If his headaches are severe enough, he needs to be seen at urgent care or emergency room for more rapid evaluation.

## 2017-07-19 ENCOUNTER — OFFICE VISIT (OUTPATIENT)
Dept: MEDICAL GROUP | Facility: MEDICAL CENTER | Age: 42
End: 2017-07-19
Payer: COMMERCIAL

## 2017-07-19 VITALS
WEIGHT: 173 LBS | SYSTOLIC BLOOD PRESSURE: 110 MMHG | DIASTOLIC BLOOD PRESSURE: 74 MMHG | HEIGHT: 71 IN | TEMPERATURE: 99.1 F | OXYGEN SATURATION: 96 % | BODY MASS INDEX: 24.22 KG/M2 | HEART RATE: 70 BPM

## 2017-07-19 DIAGNOSIS — E78.6 LOW HDL (UNDER 40): ICD-10-CM

## 2017-07-19 DIAGNOSIS — G43.109 MIGRAINE WITH AURA AND WITHOUT STATUS MIGRAINOSUS, NOT INTRACTABLE: ICD-10-CM

## 2017-07-19 DIAGNOSIS — R42 DIZZINESS: ICD-10-CM

## 2017-07-19 PROCEDURE — 99214 OFFICE O/P EST MOD 30 MIN: CPT | Performed by: FAMILY MEDICINE

## 2017-07-19 RX ORDER — AMITRIPTYLINE HYDROCHLORIDE 25 MG/1
25 TABLET, FILM COATED ORAL
Qty: 30 TAB | Refills: 0 | Status: SHIPPED | OUTPATIENT
Start: 2017-07-19 | End: 2017-12-01

## 2017-07-19 NOTE — ASSESSMENT & PLAN NOTE
Patient states that, over the last 2 months, he has been experiencing intermittent dizziness, as well as mental fog, and gait imbalance. Patient states that he is experiencing symptoms on a near daily basis, one or more symptoms that time. Patient states that he has  not had a migraine as he did previously.    Of note, patient has had an MRI of his brain approximately 10 years ago which was apparently was negative. Patient is concerned that he may be having a stroke of some sort, and is wondering if an evaluation with imaging of the brain or head would be valuable at this time.    Patient had discussed the differential diagnosis, which does include neurologic causes such as stroke versus seizures versus complex migraines, as well as cardiac causes including arrhythmias versus angina versus myocardial infarctions, as well as some more simple causes such as dehydration.    We have also discussed possible treatment modalities such as Elavil, including risks, benefits, and alternatives.

## 2017-07-19 NOTE — MR AVS SNAPSHOT
"        Pj DUNBAR Meagan   2017 3:40 PM   Office Visit   MRN: [id number]    Department:  [place] [address]   Dept Phone:  [phone]    Description:  Male : 1975   Provider:  Dejuan Trujillo M.D.           Reason for Visit     Head Ache requesting for MRI /// foggy brain >> disorientation , getting off balance      Allergies as of 2017     Allergen Noted Reactions    Contrast Media With Iodine [Iodine] 2017   Swelling      You were diagnosed with     Dizziness   [[id number]]       Migraine with aura and without status migrainosus, not intractable   [[id number]]         Vital Signs     Blood Pressure Pulse Temperature Height Weight Body Mass Index    110/74 mmHg 70 37.3 °C (99.1 °F) 1.803 m (5' 10.98\") 78.472 kg (173 lb) 24.14 kg/m2    Oxygen Saturation Smoking Status                96% Never Smoker           Basic Information     Date Of Birth Sex Race Ethnicity Preferred Language    1975 Male White Non- English      Your appointments     Oct 26, 2017  7:20 AM   Established Patient with Dejuan Trujillo M.D.   [facility]    [address]  [address]  [facility] [address] [phone]           You will be receiving a confirmation call a few days before your appointment from our automated call confirmation system.              Problem List              ICD-10-CM Priority Class Noted - Resolved    Migraine with aura and without status migrainosus, not intractable G43.109   2017 - Present    Nocturia R35.1   2017 - Present    Mixed dyslipidemia E78.2   6/15/2017 - Present    Dizziness R42   2017 - Present      Health Maintenance        Date Due Completion Dates    IMM INFLUENZA (1) 2013    IMM DTaP/Tdap/Td Vaccine (2 - Td) 2018            Current Immunizations     Influenza Vaccine Quad Inj (Pf) 2013    Tdap Vaccine 2008      Below and/or attached are the medications your provider " expects you to take. Review all of your home medications and newly ordered medications with your provider and/or pharmacist. Follow medication instructions as directed by your provider and/or pharmacist. Please keep your medication list with you and share with your provider. Update the information when medications are discontinued, doses are changed, or new medications (including over-the-counter products) are added; and carry medication information at all times in the event of emergency situations     Allergies:  CONTRAST MEDIA WITH IODINE - Swelling               Medications  Valid as of: July 19, 2017 -  4:16 PM    Generic Name Brand Name Tablet Size Instructions for use    Amitriptyline HCl (Tab) ELAVIL 25 MG Take 1 Tab by mouth every bedtime.        SUMAtriptan Succinate (Tab) IMITREX 50 MG Take 1 Tab by mouth Once PRN for Migraine for up to 1 dose. Can take second dose if no resolution after 2 hours.        .                 Medicines prescribed today were sent to:     [id number] - [place], [place] - [address]    [address] [place] [address]    Phone: [phone] Fax: [phone]    Open 24 Hours?: No      Medication refill instructions:       If your prescription bottle indicates you have medication refills left, it is not necessary to call your provider’s office. Please contact your pharmacy and they will refill your medication.    If your prescription bottle indicates you do not have any refills left, you may request refills at any time through one of the following ways: The online [facility] system (except Urgent Care), by calling your provider’s office, or by asking your pharmacy to contact your provider’s office with a refill request. Medication refills are processed only during regular business hours and may not be available until the next business day. Your provider may request additional information or to have a follow-up visit with you prior to refilling your medication.   *Please  Note: Medication refills are assigned a new Rx number when refilled electronically. Your pharmacy may indicate that no refills were authorized even though a new prescription for the same medication is available at the pharmacy. Please request the medicine by name with the pharmacy before contacting your provider for a refill.        Your To Do List     Future Labs/Procedures Complete By Expires    FREE THYROXINE  As directed 7/19/2018    TSH  As directed 7/19/2018      Referral     A referral request has been sent to our patient care coordination department. Please allow 3-5 business days for us to process this request and contact you either by phone or mail. If you do not hear from us by the 5th business day, please call us at [phone].           [employer] Access Code: Activation code not generated  Current [employer] Status: Active

## 2017-07-22 PROBLEM — E78.6 LOW HDL (UNDER 40): Status: ACTIVE | Noted: 2017-07-22

## 2017-07-22 NOTE — PROGRESS NOTES
Subjective:     Chief Complaint   Patient presents with   • Head Ache     requesting for MRI /// foggy brain >> disorientation , getting off balance       History of Present Illness:  Pj Rhodes is a 41 y.o. male established patient who presents today to discuss headaches:    Migraine with aura and without status migrainosus, not intractable  Patient states that, over the last 2 months, he has been experiencing intermittent dizziness, as well as mental fog, and gait imbalance. Patient states that he is experiencing symptoms on a near daily basis, one or more symptoms that time. Patient states that he has  not had a migraine as he did previously.    Of note, patient has had an MRI of his brain approximately 10 years ago which was apparently was negative. Patient is concerned that he may be having a stroke of some sort, and is wondering if an evaluation with imaging of the brain or head would be valuable at this time.    Patient had discussed the differential diagnosis, which does include neurologic causes such as stroke versus seizures versus complex migraines, as well as cardiac causes including arrhythmias versus angina versus myocardial infarctions, as well as some more simple causes such as dehydration.    We have also discussed possible treatment modalities such as Elavil, including risks, benefits, and alternatives.    Dizziness  Please see notes from same date of service 7/19/2017 regarding migraine.      Patient Active Problem List    Diagnosis Date Noted   • Dizziness 07/19/2017   • Mixed dyslipidemia 06/15/2017   • Migraine with aura and without status migrainosus, not intractable 05/22/2017   • Nocturia 05/22/2017       Additional History:   Allergies:    Contrast media with iodine     Current Medications:     Current Outpatient Prescriptions   Medication Sig Dispense Refill   • amitriptyline (ELAVIL) 25 MG Tab Take 1 Tab by mouth every bedtime. 30 Tab 0   • sumatriptan (IMITREX) 50 MG Tab Take 1 Tab  "by mouth Once PRN for Migraine for up to 1 dose. Can take second dose if no resolution after 2 hours. 10 Tab 3     No current facility-administered medications for this visit.        Social History:     Social History   Substance Use Topics   • Smoking status: Never Smoker    • Smokeless tobacco: Never Used   • Alcohol Use: 0.6 oz/week     1 Cans of beer per week       ROS:     - ROS is NEGATIVE for dizziness, generalized weakness/fatigue, vision/hearing changes, jaw pain/paresthesias, BUE pain/paresthesias/numbness/weakness, chest pain/pressure, palpitations, dyspnea, RUQ abdominal pain, oliguria/anuria, BLE edema.    - NOTE: All other systems reviewed and are negative, except as in HPI.     Objective:   Physical Exam:    Vitals: Blood pressure 110/74, pulse 70, temperature 37.3 °C (99.1 °F), height 1.803 m (5' 10.98\"), weight 78.472 kg (173 lb), SpO2 96 %.   BMI: Body mass index is 24.14 kg/(m^2).   General/Constitutional: Vitals as above, Well nourished, well developed male in no acute distress   Head/Eyes: Head is grossly normal & atraumatic, bilateral conjunctivae clear and not injected, bilateral EOMI, bilateral PERRL   ENT: Bilateral external ears grossly normal in appearance, Hearing grossly intact, External nares normal in appearance and without discharge/bleeding   Respiratory: No respiratory distress, bilateral lungs are clear to ausculation in all lung fields (anterior/lateral/posterior), no wheezing/rhonchi/rales   Cardiovascular: Regular rate and rhythm without murmur/gallops/rubs, distal pulses are intact and equal bilaterally (radial, posterior tibial), no bilateral lower extremity edema   MSK: Gait grossly normal & not antalgic   Integumentary: No apparent rashes   Neuro: CN 2-12 tested and grossly intact, strength testing in upper and lower extremities is 5/5 and equal bilaterally, deep tendon reflexes tested and appropriate (biceps, triceps, patellar, Achilles), Gross motor movement intact in all " 4 extremities, Gross sensation intact to extremities and trunk, Gait grossly normal and not antalgic, no dysmetria on gross testing (finger to nose, heel to shin), no diadochokinesia on gross testing of rapid alternating movements (hands, feet)   Psych: Judgment grossly appropriate, no apparent depression/anxiety    Labs/Imaging:     -  05/24/17 -- HDL39, UBC110 --> Low HDL , CMP WNL, a1c WNL, PSA WNL    Assessment and Plan:   1. Migraine with aura and without status migrainosus, not intractable  2. Dizziness  Uncontrolled.     A) Discussion: Patient I discussed that stress and anxiety can increase the frequency as well as severity of migraines. Patient I also discussed that rather than using abortive only (Imitrex) to treat his migraines, I'm suspecting that she has had a progression of migraines the point of needing preventative medication.    B) Management: Labs as below, as well as referral to neurology and trial of Elavil.  Patient given ER precautions.    - TSH; Future    - FREE THYROXINE; Future    - REFERRAL TO NEUROLOGY    - amitriptyline (ELAVIL) 25 MG Tab; Take 1 Tab by mouth every bedtime.  Dispense: 30 Tab; Refill: 0   C) Future planning: Possible MRI versus CT scan. We discussed that these tests can be very expensive, and should only be used on strong suspicion of CVA or other neurological abnormality, or potential for grave outcome.    Re: Low HDL --> Patient and I discussed the importance of lifestyle changes, with particular emphasis on plant-based nutrition (for the purposes of weight loss, general health, low HDL), as well as cardiovascular exercise.  Patient verbalized understanding.    RTC: in 3 months for follow-up of migraines.    PLEASE NOTE: This dictation was created using voice recognition software. I have made every reasonable attempt to correct obvious errors, but I expect that there are errors of grammar and possibly content that I did not discover before finalizing the note.

## 2017-11-10 ENCOUNTER — APPOINTMENT (OUTPATIENT)
Dept: NEUROLOGY | Facility: MEDICAL CENTER | Age: 42
End: 2017-11-10
Payer: COMMERCIAL

## 2017-11-19 ENCOUNTER — OFFICE VISIT (OUTPATIENT)
Dept: URGENT CARE | Facility: CLINIC | Age: 42
End: 2017-11-19
Payer: COMMERCIAL

## 2017-11-19 VITALS
HEIGHT: 71 IN | OXYGEN SATURATION: 97 % | BODY MASS INDEX: 24.08 KG/M2 | WEIGHT: 172 LBS | RESPIRATION RATE: 18 BRPM | SYSTOLIC BLOOD PRESSURE: 110 MMHG | DIASTOLIC BLOOD PRESSURE: 65 MMHG | TEMPERATURE: 96.6 F | HEART RATE: 68 BPM

## 2017-11-19 DIAGNOSIS — R30.0 DYSURIA: ICD-10-CM

## 2017-11-19 DIAGNOSIS — J02.9 SORE THROAT: ICD-10-CM

## 2017-11-19 LAB
APPEARANCE UR: CLEAR
BILIRUB UR STRIP-MCNC: NORMAL MG/DL
COLOR UR AUTO: YELLOW
GLUCOSE UR STRIP.AUTO-MCNC: NORMAL MG/DL
INT CON NEG: NORMAL
INT CON POS: NORMAL
KETONES UR STRIP.AUTO-MCNC: 5 MG/DL
LEUKOCYTE ESTERASE UR QL STRIP.AUTO: NORMAL
NITRITE UR QL STRIP.AUTO: NORMAL
PH UR STRIP.AUTO: 6 [PH] (ref 5–8)
PROT UR QL STRIP: NORMAL MG/DL
RBC UR QL AUTO: NORMAL
S PYO AG THROAT QL: NORMAL
SP GR UR STRIP.AUTO: 1.01
UROBILINOGEN UR STRIP-MCNC: NORMAL MG/DL

## 2017-11-19 PROCEDURE — 99214 OFFICE O/P EST MOD 30 MIN: CPT | Performed by: FAMILY MEDICINE

## 2017-11-19 PROCEDURE — 81002 URINALYSIS NONAUTO W/O SCOPE: CPT | Performed by: FAMILY MEDICINE

## 2017-11-19 PROCEDURE — 87880 STREP A ASSAY W/OPTIC: CPT | Performed by: FAMILY MEDICINE

## 2017-11-19 RX ORDER — CEFDINIR 300 MG/1
300 CAPSULE ORAL EVERY 12 HOURS
Qty: 14 CAP | Refills: 0 | Status: SHIPPED | OUTPATIENT
Start: 2017-11-19 | End: 2017-11-26

## 2017-11-19 ASSESSMENT — ENCOUNTER SYMPTOMS
CHILLS: 0
FEVER: 0
DIZZINESS: 0
COUGH: 0
FOCAL WEAKNESS: 0
SORE THROAT: 1
SPUTUM PRODUCTION: 0

## 2017-11-19 NOTE — PROGRESS NOTES
"Subjective:      Pj Rhodes is a 42 y.o. male who presents with Pharyngitis and Other (possible bladder infection )    Chief Complaint   Patient presents with   • Pharyngitis   • Other     possible bladder infection         - This is a very pleasant 42 y.o. male with complaints of ST x 1-2 wks, no NVFC. Does feel a little PND.      Also some slight burning on urination x 1-2 wks. No penile DC, does not suspect STD          ALLERGIES:  Contrast media with iodine [iodine]     PMH:  Past Medical History:   Diagnosis Date   • Vertigo         MEDS:    Current Outpatient Prescriptions:   •  amitriptyline (ELAVIL) 25 MG Tab, Take 1 Tab by mouth every bedtime., Disp: 30 Tab, Rfl: 0  •  sumatriptan (IMITREX) 50 MG Tab, Take 1 Tab by mouth Once PRN for Migraine for up to 1 dose. Can take second dose if no resolution after 2 hours., Disp: 10 Tab, Rfl: 3    ** I have documented what I find to be significant in regards to past medical, social, family and surgical history  in my HPI or under PMH/PSH/FH review section, otherwise it is contributory **           HPI    Review of Systems   Constitutional: Negative for chills and fever.   HENT: Positive for sore throat. Negative for congestion.    Respiratory: Negative for cough and sputum production.    Genitourinary: Positive for dysuria.   Neurological: Negative for dizziness and focal weakness.          Objective:     /65   Pulse 68   Temp 35.9 °C (96.6 °F)   Resp 18   Ht 1.803 m (5' 11\")   Wt 78 kg (172 lb)   SpO2 97%   BMI 23.99 kg/m²      Physical Exam   Constitutional: He appears well-developed. No distress.   HENT:   Head: Normocephalic and atraumatic.   Eyes: Conjunctivae are normal.   Neck: Neck supple.   Cardiovascular: Regular rhythm.    No murmur heard.  Pulmonary/Chest: Effort normal and breath sounds normal. No respiratory distress.   Abdominal: Soft. There is no tenderness.   Neurological: He is alert. He exhibits normal muscle tone.   Skin: Skin is " warm and dry.   Psychiatric: He has a normal mood and affect. Judgment normal.   Nursing note and vitals reviewed.  mild posterior pharyngeal and tonsil erythema.             Assessment/Plan:         1. Sore throat  POCT Rapid Strep A   2. Dysuria  POCT Urinalysis             Dx & d/c instructions discussed w/ patient and/or family members. Follow up w/ Prvt Dr or here in 3-4 days if not getting better, sooner if needed,  ER if worse and UC/PCP unavailable.        Possible side effects (i.e. Rash, GI upset/constipation, sedation, elevation of BP or sugars) of any medications given discussed.

## 2017-12-01 ENCOUNTER — OFFICE VISIT (OUTPATIENT)
Dept: MEDICAL GROUP | Facility: LAB | Age: 42
End: 2017-12-01
Payer: COMMERCIAL

## 2017-12-01 ENCOUNTER — HOSPITAL ENCOUNTER (OUTPATIENT)
Dept: LAB | Facility: MEDICAL CENTER | Age: 42
End: 2017-12-01
Attending: INTERNAL MEDICINE
Payer: COMMERCIAL

## 2017-12-01 VITALS
SYSTOLIC BLOOD PRESSURE: 108 MMHG | TEMPERATURE: 98.2 F | HEART RATE: 86 BPM | WEIGHT: 171.8 LBS | HEIGHT: 71 IN | BODY MASS INDEX: 24.05 KG/M2 | RESPIRATION RATE: 18 BRPM | OXYGEN SATURATION: 98 % | DIASTOLIC BLOOD PRESSURE: 68 MMHG

## 2017-12-01 DIAGNOSIS — Z23 NEED FOR VACCINATION: ICD-10-CM

## 2017-12-01 DIAGNOSIS — E55.9 VITAMIN D DEFICIENCY: ICD-10-CM

## 2017-12-01 DIAGNOSIS — E78.2 MIXED DYSLIPIDEMIA: ICD-10-CM

## 2017-12-01 DIAGNOSIS — G43.109 MIGRAINE WITH AURA AND WITHOUT STATUS MIGRAINOSUS, NOT INTRACTABLE: ICD-10-CM

## 2017-12-01 DIAGNOSIS — E03.9 ACQUIRED HYPOTHYROIDISM: ICD-10-CM

## 2017-12-01 DIAGNOSIS — R42 DIZZINESS: ICD-10-CM

## 2017-12-01 PROBLEM — R35.1 NOCTURIA: Status: RESOLVED | Noted: 2017-05-22 | Resolved: 2017-12-01

## 2017-12-01 LAB
25(OH)D3 SERPL-MCNC: 15 NG/ML (ref 30–100)
T4 FREE SERPL-MCNC: 0.77 NG/DL (ref 0.53–1.43)
TSH SERPL DL<=0.005 MIU/L-ACNC: 3.79 UIU/ML (ref 0.3–3.7)
VIT B12 SERPL-MCNC: 244 PG/ML (ref 211–911)

## 2017-12-01 PROCEDURE — 36415 COLL VENOUS BLD VENIPUNCTURE: CPT

## 2017-12-01 PROCEDURE — 99204 OFFICE O/P NEW MOD 45 MIN: CPT | Mod: 25 | Performed by: INTERNAL MEDICINE

## 2017-12-01 PROCEDURE — 90686 IIV4 VACC NO PRSV 0.5 ML IM: CPT | Performed by: INTERNAL MEDICINE

## 2017-12-01 PROCEDURE — 84443 ASSAY THYROID STIM HORMONE: CPT

## 2017-12-01 PROCEDURE — 84439 ASSAY OF FREE THYROXINE: CPT

## 2017-12-01 PROCEDURE — 82607 VITAMIN B-12: CPT

## 2017-12-01 PROCEDURE — 82306 VITAMIN D 25 HYDROXY: CPT

## 2017-12-01 PROCEDURE — 90471 IMMUNIZATION ADMIN: CPT | Performed by: INTERNAL MEDICINE

## 2017-12-01 RX ORDER — ERGOCALCIFEROL 1.25 MG/1
50000 CAPSULE ORAL
Qty: 8 CAP | Refills: 1 | Status: SHIPPED | OUTPATIENT
Start: 2017-12-01 | End: 2018-01-20

## 2017-12-01 RX ORDER — LEVOTHYROXINE SODIUM 0.03 MG/1
25 TABLET ORAL
Qty: 30 TAB | Refills: 4 | Status: SHIPPED | OUTPATIENT
Start: 2017-12-01 | End: 2017-12-31

## 2017-12-01 NOTE — PROGRESS NOTES
Chief Complaint   Patient presents with   • Migraine   • Headache       Subjective:     History of Present Illness: Patient is a 42 y.o. male. This pleasant patient is here today to establish care with a new primary care provider and discuss his problem of migraine and dizziness.    Migraine with aura and without status migrainosus, not intractable  This is a new problem.  This patient has a long-standing history of migraine with aura at least for the last 11 years. He had a normal MRI brain when he was 31 years. He told me usually he gets when migraine attack. Year but for the last 6 months he experienced at least 4-5 episodes.  His typical migraine is preceded with an aura that is consistent with a left sided numbness and tingling of the left side of his tongue. 30 minutes later he started to the follow-up a very severe headache that is bilateral, 10/10 in intensity, associated with nausea, photophobia and phonophobia. He usually has to interrupt his pain and go to bed to break the cycle of the headache. He normally takes Excedrin migraine to abort the episode and he was recently prescribed Imitrex from Dr. Trujillo but he has not picked up the prescription yet.  Also, he was prescribed amitriptyline as prophylactic therapy but he has not started using that because of potential side effects and also the fact that his headache is not frequent enough to qualify for prophylactic therapy.  With his concerning for him, is that for the last 6 months he has been experiencing multiple episodes in a day with brief disorientation, lack of balance, and what he describes as dizziness. He stated that he feels lightheaded, he has word finding difficulty, he has a hard time interpreting what he is being told, and sometimes he feels is about to fall.  He denied any focal neurological symptoms such as weakness or loss of sensation.  He did tell me that he has a lot of stress at his job but not at home.    He has an appointment with  neurologist Dr. Alfaro is scheduled for January 24th he is concerned that those episodes are becoming more frequent that he needs an earlier evaluation.    Mixed dyslipidemia  This is a chronic controlled problem.   I reviewed his most recent lipid panel in May of this year:  Lab Results   Component Value Date/Time    CHOLSTRLTOT 179 05/24/2017 08:07 AM     (H) 05/24/2017 08:07 AM    HDL 39 (A) 05/24/2017 08:07 AM    TRIGLYCERIDE 89 05/24/2017 08:07 AM     Dr. Trujillo has discussed with him the importance of healthy lifestyle with more fruits and be supposed to increase his HDL as well as regular exercise. He told me that he has not made any changes in his lifestyle., Encouraged him to make those changes and we will just recheck his lipid panel in one year.    Dizziness  This is a new problem.  Described in the section of migraine.      Allergies: Contrast media with iodine [iodine]    Current Outpatient Prescriptions Ordered in [facility]   Medication Sig Dispense Refill   • sumatriptan (IMITREX) 50 MG Tab Take 1 Tab by mouth Once PRN for Migraine for up to 1 dose. Can take second dose if no resolution after 2 hours. 10 Tab 3     No current Epic-ordered facility-administered medications on file.        Past Medical History:   Diagnosis Date   • Vertigo        Past Surgical History:   Procedure Laterality Date   • EYE SURGERY Left 1998    Lasik   • VASECTOMY  2011?       Social History   Substance Use Topics   • Smoking status: Never Smoker   • Smokeless tobacco: Never Used   • Alcohol use 1.2 oz/week     2 Cans of beer per week       Family History   Problem Relation Age of Onset   • No Known Problems Mother    • No Known Problems Father    • No Known Problems Sister    • Dementia Paternal Grandfather    • Cancer Neg Hx        ROS:     - Constitutional: Negative for fever, chills, unexpected weight change, and fatigue/generalized weakness.     - HEENT: Negative for headaches, vision changes, hearing changes, ear  "pain, ear discharge, sinus congestion, sore throat, and neck pain.      - Respiratory: Negative for cough, sputum production, dyspnea and wheezing.    - Cardiovascular: Negative for chest pain, palpitations, orthopnea, and bilateral lower extremity edema.     - Gastrointestinal: Negative for heartburn, nausea, vomiting, abdominal pain, hematochezia, melena, diarrhea, constipation, and greasy/foul-smelling stools.     - Genitourinary: Negative for dysuria, polyuria, hematuria, pyuria, urinary urgency, and urinary incontinence.    - Musculoskeletal: Negative for myalgias, back pain, and joint pain.     - Skin: Negative for rash, itching, cyanotic skin color change.     - Neurological: As per history of present illness.  - Endo/Heme/Allergies: Does not bruise/bleed easily.     - Psychiatric/Behavioral: Stress at work, Negative for depression, suicidal/homicidal ideation and memory loss.        - NOTE: All other systems reviewed and are negative, except as in HPI.       Physical Exam:     Blood pressure 108/68, pulse 86, temperature 36.8 °C (98.2 °F), resp. rate 18, height 1.803 m (5' 11\"), weight 77.9 kg (171 lb 12.8 oz), SpO2 98 %. Body mass index is 23.96 kg/m².   General: Normal appearing. No distress.  HEENT: Normocephalic. Eyes conjunctiva clear lids without ptosis, pupils equal and reactive to light accommodation, ears normal shape and contour, canals are clear bilaterally, tympanic membranes are benign,  oropharynx is without erythema, edema or exudates.    Neck: Supple without JVD or bruit. Thyroid is not enlarged.  Pulmonary: Clear to ausculation.  Normal effort. No rales, ronchi, or wheezing.  Cardiovascular: Regular rate and rhythm without murmur. Radial pulses are intact, regular and symmetrical bilaterally.  Abdomen: Soft, nontender, nondistended. Normal bowel sounds. Liver and spleen are not palpable.  Neurologic:Cranial nerve exam II-XII was normal. Bilateral upper and lower extremity strength is normal " 5/5. Sensation was normal bilateral upper and lower extremity including fine touch, crude touch, microfilament and proprioception. Bilateral biceps, triceps, brachioradialis, knees and ankle reflexes were normal and symmetrical.  Romberg sign was normal. Cerebellar exam was normal.  Lymph: No cervical, occipital or supraclavicular lymph nodes are palpable  Skin: Warm and dry.  No obvious lesions.  Musculoskeletal: Normal gait. No extremity cyanosis, clubbing, or edema.  Psych: Normal mood and affect. Alert and oriented x3. Judgment and insight is normal.      Assessment and Plan:     1. Migraine with aura and without status migrainosus, not intractable  This is a chronic uncontrolled problem.   As discussed in the history of present illness section, this patient has a change in his migraine pattern including frequency and intensity for the last 6 months. Also, he described some vague dizziness and lack of balance symptoms but his neurologic exam was completely normal including cerebellar exam. What is concerning to him are the episodes of brief disorientation that he described. They are certainly not consistent with a seizure activity based on history.  I recommended that he continues to use Imitrex for abortive therapy for his migraine, at this point I think he doesn't need to be on prophylactic therapy because the frequency of his headache is not enough.  Given the change in the pattern of his headache we will proceed with MRI brain to exclude any structural abnormality.  He was instructed to keep his consultation appointment with Dr. Alfaro on January 24 for further evaluation  - MR-BRAIN-W/O; Future    2. Dizziness  This is a new problem as described in #1.  I will check a B12, vitamin D and thyroid function to exclude reversible causes. I will also proceed with a vestibular rehabilitation referral because this could be an unusual case of BPPV.  Neurology follow-up.  - VITAMIN B12; Future  - VITAMIN D,25  HYDROXY; Future  - TSH-REF; Future  - REFERRAL TO PHYSICAL THERAPY Reason for Therapy: Eval/Treat/Report    3. Need for vaccination  Given today.  - INFLUENZA VACCINE QUAD INJ >3Y(PF)    4. Mixed dyslipidemia  This is a chronic controlled problem.   Discussed in depth the importance of lifestyle modifications. We'll consider repeating lipid panel next year.      Health Maintenance:      Completed  Health Maintenance Due   Topic   • IMM INFLUENZA (1)       Follow Up:      Return in about 2 months (around 2/1/2018).    Please note that this dictation was created using voice recognition software. I have made every reasonable attempt to correct obvious errors, but I expect that there are errors of grammar and possibly content that I did not discover before finalizing the note.    Signed by: Regi Alcantara M.D.

## 2017-12-01 NOTE — ASSESSMENT & PLAN NOTE
This is a chronic controlled problem.   I reviewed his most recent lipid panel in May of this year:  Lab Results   Component Value Date/Time    CHOLSTRLTOT 179 05/24/2017 08:07 AM     (H) 05/24/2017 08:07 AM    HDL 39 (A) 05/24/2017 08:07 AM    TRIGLYCERIDE 89 05/24/2017 08:07 AM     Dr. Trujillo has discussed with him the importance of healthy lifestyle with more fruits and be supposed to increase his HDL as well as regular exercise. He told me that he has not made any changes in his lifestyle., Encouraged him to make those changes and we will just recheck his lipid panel in one year.

## 2017-12-01 NOTE — ASSESSMENT & PLAN NOTE
This is a new problem.  This patient has a long-standing history of migraine with aura at least for the last 11 years. He had a normal MRI brain when he was 31 years. He told me usually he gets when migraine attack. Year but for the last 6 months he experienced at least 4-5 episodes.  His typical migraine is preceded with an aura that is consistent with a left sided numbness and tingling of the left side of his tongue. 30 minutes later he started to the follow-up a very severe headache that is bilateral, 10/10 in intensity, associated with nausea, photophobia and phonophobia. He usually has to interrupt his pain and go to bed to break the cycle of the headache. He normally takes Excedrin migraine to abort the episode and he was recently prescribed Imitrex from Dr. Trujillo but he has not picked up the prescription yet.  Also, he was prescribed amitriptyline as prophylactic therapy but he has not started using that because of potential side effects and also the fact that his headache is not frequent enough to qualify for prophylactic therapy.  With his concerning for him, is that for the last 6 months he has been experiencing multiple episodes in a day with brief disorientation, lack of balance, and what he describes as dizziness. He stated that he feels lightheaded, he has word finding difficulty, he has a hard time interpreting what he is being told, and sometimes he feels is about to fall.  He denied any focal neurological symptoms such as weakness or loss of sensation.  He did tell me that he has a lot of stress at his job but not at home.    He has an appointment with neurologist Dr. Brownek is scheduled for January 24th he is concerned that those episodes are becoming more frequent that he needs an earlier evaluation.

## 2017-12-03 ENCOUNTER — TELEPHONE (OUTPATIENT)
Dept: MEDICAL GROUP | Facility: LAB | Age: 42
End: 2017-12-03

## 2017-12-04 ENCOUNTER — HOSPITAL ENCOUNTER (OUTPATIENT)
Dept: RADIOLOGY | Facility: MEDICAL CENTER | Age: 42
End: 2017-12-04
Attending: INTERNAL MEDICINE
Payer: COMMERCIAL

## 2017-12-04 ENCOUNTER — TELEPHONE (OUTPATIENT)
Dept: MEDICAL GROUP | Facility: LAB | Age: 42
End: 2017-12-04

## 2017-12-04 DIAGNOSIS — G43.109 MIGRAINE WITH AURA AND WITHOUT STATUS MIGRAINOSUS, NOT INTRACTABLE: ICD-10-CM

## 2017-12-04 PROCEDURE — 70551 MRI BRAIN STEM W/O DYE: CPT

## 2017-12-04 NOTE — TELEPHONE ENCOUNTER
Phone Number Called: [phone] (home) [phone] (work) Pj Rhodes    Message: I left a voicemail to call us back or check his Mychart regarding his lab results from 12/1/2017 and if he wanted us to order an MRI of his head.    Left Message for patient to call back: yes

## 2017-12-04 NOTE — TELEPHONE ENCOUNTER
----- Message from Regi Alcantara M.D. sent at 12/1/2017  6:42 PM PST -----  Leigha Oliva,  I reviewed the results and they show that you have a very low vitamin D level at 15, I will send you a prescription for high-dose vitamin D to her [facility] pharmacy. Please start taking ergocalciferol 50,000 international units once a week for 8 weeks.  Also, your TSH is elevated a bit higher than normal, yours is 3.79 and the upper limit of normal is 3.7. This does not necessarily indicate that you have a hypothyroidism but given your symptoms I will start you on a very low-dose thyroid replacement therapy, I will send a prescription for levothyroxine 25 µg daily. Please start taking that early in the morning on an empty stomach, at least 30 minutes before food. I will also recheck your TSH prior to her next follow-up appointment in February. In fact they put in the order to be done in mid January. He can have it done it in urine lab and I will you know about the results.    Thank you,  Please let me know if you have any further questions.  Regi Alcantara M.D.

## 2017-12-05 ENCOUNTER — PATIENT MESSAGE (OUTPATIENT)
Dept: MEDICAL GROUP | Facility: LAB | Age: 42
End: 2017-12-05

## 2017-12-05 NOTE — TELEPHONE ENCOUNTER
----- Message from Regi Alcantara M.D. sent at 12/5/2017  8:27 AM PST -----  Leigha Oliva,  I reviewed the result of your brain MRI and am happy to tell you that it came back normal.  Please proceed with physical therapy consultation for vestibular rehabilitation. This will help with your vertigo/dizziness symptoms.  Also, you can pick your prescription for levothyroxine (thyroid replacement therapy) and please make sure that you do your repeat thyroid lab before your next follow up visit.  You can do it at any [facility] lab, they will have the order in the computer.    Thank you,  Regi Alcantara M.D.

## 2017-12-05 NOTE — TELEPHONE ENCOUNTER
Phone Number Called: Pj Rhodes  [phone] (home) [phone] (work)    Message: I left a voicemail to call us back or [facility].    Left Message for patient to call back: yes

## 2018-01-05 ENCOUNTER — APPOINTMENT (OUTPATIENT)
Dept: PHYSICAL THERAPY | Facility: REHABILITATION | Age: 43
End: 2018-01-05
Attending: INTERNAL MEDICINE
Payer: COMMERCIAL

## 2018-01-16 ENCOUNTER — OFFICE VISIT (OUTPATIENT)
Dept: URGENT CARE | Facility: CLINIC | Age: 43
End: 2018-01-16
Payer: COMMERCIAL

## 2018-01-16 VITALS
SYSTOLIC BLOOD PRESSURE: 124 MMHG | WEIGHT: 174 LBS | TEMPERATURE: 97.7 F | BODY MASS INDEX: 24.36 KG/M2 | OXYGEN SATURATION: 96 % | HEIGHT: 71 IN | RESPIRATION RATE: 14 BRPM | DIASTOLIC BLOOD PRESSURE: 80 MMHG | HEART RATE: 64 BPM

## 2018-01-16 DIAGNOSIS — J02.0 STREP PHARYNGITIS: ICD-10-CM

## 2018-01-16 LAB
INT CON NEG: NORMAL
INT CON POS: NORMAL
S PYO AG THROAT QL: POSITIVE

## 2018-01-16 PROCEDURE — 99214 OFFICE O/P EST MOD 30 MIN: CPT | Performed by: PHYSICIAN ASSISTANT

## 2018-01-16 PROCEDURE — 87880 STREP A ASSAY W/OPTIC: CPT | Performed by: PHYSICIAN ASSISTANT

## 2018-01-16 RX ORDER — AMOXICILLIN 875 MG/1
875 TABLET, COATED ORAL 2 TIMES DAILY
Qty: 20 TAB | Refills: 0 | Status: SHIPPED | OUTPATIENT
Start: 2018-01-16 | End: 2018-01-26

## 2018-01-16 ASSESSMENT — ENCOUNTER SYMPTOMS
SPUTUM PRODUCTION: 0
VOMITING: 0
PALPITATIONS: 0
TINGLING: 0
DIARRHEA: 0
FEVER: 0
ABDOMINAL PAIN: 0
FOCAL WEAKNESS: 0
SWOLLEN GLANDS: 0
SORE THROAT: 1
DIZZINESS: 0
MYALGIAS: 0
CHILLS: 0
SHORTNESS OF BREATH: 0
NAUSEA: 0
COUGH: 0
SENSORY CHANGE: 0
WHEEZING: 0
HEADACHES: 0
STRIDOR: 0

## 2018-01-16 NOTE — PROGRESS NOTES
Subjective:      Pj Rhodes is a 42 y.o. male who presents with Other (sore throat)            Pharyngitis    This is a new problem. The current episode started yesterday. The problem has been unchanged. There has been no fever. The pain is moderate. Associated symptoms include congestion. Pertinent negatives include no abdominal pain, coughing, diarrhea, ear discharge, ear pain, headaches, plugged ear sensation, shortness of breath, stridor, swollen glands or vomiting. Associated symptoms comments: Rhinorrhea . He has tried nothing for the symptoms.       Past Medical History:   Diagnosis Date   • Acquired hypothyroidism 12/1/2017    TSH mildly elevated Nov 2017.    • Vertigo    • Vitamin D deficiency 12/1/2017    Vit D level 15 Nov 2017.     .  Past Surgical History:   Procedure Laterality Date   • EYE SURGERY Left 1998    Lasik   • VASECTOMY  2011?       Family History   Problem Relation Age of Onset   • No Known Problems Mother    • No Known Problems Father    • No Known Problems Sister    • Dementia Paternal Grandfather    • Cancer Neg Hx        Allergies   Allergen Reactions   • Contrast Media With Iodine [Iodine] Swelling       Medications, Allergies, and current problem list reviewed today in Epic      Review of Systems   Constitutional: Positive for malaise/fatigue. Negative for chills and fever.   HENT: Positive for congestion and sore throat. Negative for ear discharge and ear pain.    Respiratory: Negative for cough, sputum production, shortness of breath, wheezing and stridor.    Cardiovascular: Negative for chest pain, palpitations and leg swelling.   Gastrointestinal: Negative for abdominal pain, diarrhea, nausea and vomiting.   Musculoskeletal: Negative for myalgias.   Skin: Negative for rash.   Neurological: Negative for dizziness, tingling, sensory change, focal weakness and headaches.     All other systems reviewed and are negative.        Objective:     /80   Pulse 64   Temp  "36.5 °C (97.7 °F)   Resp 14   Ht 1.803 m (5' 11\")   Wt 78.9 kg (174 lb)   SpO2 96%   BMI 24.27 kg/m²      Physical Exam   Constitutional: He is oriented to person, place, and time. He appears well-developed and well-nourished. No distress.   HENT:   Head: Normocephalic and atraumatic.   Right Ear: Tympanic membrane, external ear and ear canal normal.   Left Ear: Tympanic membrane, external ear and ear canal normal.   Nose: Nose normal.   Mouth/Throat: Uvula is midline and mucous membranes are normal. Posterior oropharyngeal erythema present. Tonsils are 0 on the right. Tonsils are 0 on the left. No tonsillar exudate.   Eyes: Conjunctivae are normal.   Neck: Neck supple.   Cardiovascular: Normal rate, regular rhythm and normal heart sounds.  Exam reveals no gallop and no friction rub.    No murmur heard.  Pulmonary/Chest: Effort normal and breath sounds normal. No respiratory distress. He has no wheezes. He has no rales.   Lymphadenopathy:     He has cervical adenopathy (slight anterior cervical adenopathy ).   Neurological: He is alert and oriented to person, place, and time.   Skin: Skin is warm and dry. No rash noted.   Psychiatric: He has a normal mood and affect. His behavior is normal. Judgment and thought content normal.               Assessment/Plan:     1. Strep pharyngitis    - POCT Rapid Strep A- positive   - amoxicillin (AMOXIL) 875 MG tablet; Take 1 Tab by mouth 2 times a day for 10 days.  Dispense: 20 Tab; Refill: 0     Differential diagnoses, Supportive care, and indications for immediate follow-up discussed with patient.   Instructed to return to clinic or nearest emergency department for any change in condition, further concerns, or worsening of symptoms.    The patient demonstrated a good understanding and agreed with the treatment plan.    Norma Lin P.A.-C.      "

## 2018-02-02 ENCOUNTER — TELEPHONE (OUTPATIENT)
Dept: MEDICAL GROUP | Facility: LAB | Age: 43
End: 2018-02-02

## 2018-04-02 DIAGNOSIS — E03.9 ACQUIRED HYPOTHYROIDISM: ICD-10-CM

## 2018-04-25 ENCOUNTER — TELEPHONE (OUTPATIENT)
Dept: MEDICAL GROUP | Facility: LAB | Age: 43
End: 2018-04-25

## 2018-04-25 NOTE — TELEPHONE ENCOUNTER
Phone Number Called: [phone] (home) [phone] (work)    Message:I left a voicemail on Pj's cell phone stating that  Dr. Alcantara will be out of the office on personal leave during the month of May. We have rescheduled your appointment from Friday 5/4/18 to Friday 6/8/18 at 3:20pm with a check in at 3:10pm. Please contact us if you are unable to keep this appointment ([phone] ask for Medical Group Scheduling or using [employer] application) and sorry for the short notice.     Left Message for patient to call back: yes

## 2018-05-01 ENCOUNTER — HOSPITAL ENCOUNTER (OUTPATIENT)
Dept: LAB | Facility: MEDICAL CENTER | Age: 43
End: 2018-05-01
Attending: INTERNAL MEDICINE
Payer: COMMERCIAL

## 2018-05-01 DIAGNOSIS — E03.9 ACQUIRED HYPOTHYROIDISM: ICD-10-CM

## 2018-05-01 LAB — TSH SERPL DL<=0.005 MIU/L-ACNC: 3.66 UIU/ML (ref 0.38–5.33)

## 2018-05-01 PROCEDURE — 36415 COLL VENOUS BLD VENIPUNCTURE: CPT

## 2018-05-01 PROCEDURE — 84443 ASSAY THYROID STIM HORMONE: CPT

## 2018-06-07 ENCOUNTER — TELEPHONE (OUTPATIENT)
Dept: MEDICAL GROUP | Facility: LAB | Age: 43
End: 2018-06-07

## 2018-06-07 NOTE — TELEPHONE ENCOUNTER
ESTABLISHED PATIENT PRE-VISIT PLANNING     Note: Patient will not be contacted if there is no indication to call.     1.  Reviewed notes from the last few office visits within the medical group: Yes    2.  If any orders were placed at last visit or intended to be done for this visit (i.e. 6 mos follow-up), do we have Results/Consult Notes?        •  Labs - Labs ordered, completed on 5/1/18 and results are in chart.       •  Imaging - Imaging was not ordered at last office visit.       •  Referrals - No referrals were ordered at last office visit.    3. Is this appointment scheduled as a Hospital Follow-Up? No    4.  Immunizations were updated in Epic using WebIZ?: Epic matches WebIZ       •  Web Iz Recommendations: Patient is up to date on all vaccines    5.  Patient is due for the following Health Maintenance Topics:   There are no preventive care reminders to display for this patient.    - Patient is up-to-date on all Health Maintenance topics. No records have been requested at this time.    6.  MDX printed for Provider? NO    7.  Patient was NOT informed to arrive 15 min prior to their scheduled appointment and bring in their medication bottles.

## 2018-06-08 ENCOUNTER — OFFICE VISIT (OUTPATIENT)
Dept: MEDICAL GROUP | Facility: LAB | Age: 43
End: 2018-06-08
Payer: COMMERCIAL

## 2018-06-08 VITALS
OXYGEN SATURATION: 96 % | RESPIRATION RATE: 16 BRPM | BODY MASS INDEX: 24.16 KG/M2 | HEART RATE: 60 BPM | HEIGHT: 71 IN | WEIGHT: 172.6 LBS | DIASTOLIC BLOOD PRESSURE: 64 MMHG | TEMPERATURE: 98.5 F | SYSTOLIC BLOOD PRESSURE: 110 MMHG

## 2018-06-08 DIAGNOSIS — E55.9 VITAMIN D DEFICIENCY: ICD-10-CM

## 2018-06-08 DIAGNOSIS — G43.109 MIGRAINE WITH AURA AND WITHOUT STATUS MIGRAINOSUS, NOT INTRACTABLE: ICD-10-CM

## 2018-06-08 DIAGNOSIS — E78.2 MIXED DYSLIPIDEMIA: ICD-10-CM

## 2018-06-08 DIAGNOSIS — E03.9 ACQUIRED HYPOTHYROIDISM: ICD-10-CM

## 2018-06-08 PROCEDURE — 99214 OFFICE O/P EST MOD 30 MIN: CPT | Performed by: INTERNAL MEDICINE

## 2018-06-08 RX ORDER — LEVOTHYROXINE SODIUM 0.03 MG/1
25 TABLET ORAL
Qty: 30 TAB | Refills: 6 | Status: SHIPPED | OUTPATIENT
Start: 2018-06-08 | End: 2018-12-17 | Stop reason: SDUPTHER

## 2018-06-08 ASSESSMENT — PATIENT HEALTH QUESTIONNAIRE - PHQ9: CLINICAL INTERPRETATION OF PHQ2 SCORE: 0

## 2018-06-08 NOTE — PROGRESS NOTES
Chief Complaint   Patient presents with   • Thyroid Problem   • Dizziness   • Results     lab results   •        Subjective:     HPI:   Pj presents today with the following.    Migraine with aura and without status migrainosus, not intractable  This is a chronic controlled problem.   He stated that the frequency of his migraine is becoming very sporadic, he almost got one episode over the last 1 year.  We were concerned about the change in the pattern of his migraine last time we ordered an MRI of his brain.  We reviewed his MRI together and it came back normal.  He decided not to meet with a neurologist.  He still keeps a prescription for Imitrex but has not used it recently.        Acquired hypothyroidism  New to me, chronic uncontrolled.  He complains about feeling really slow, his hair is falling, dry skin.  He denies any constipation, weight gain.  We reviewed his TSH from November 2017 which was high, he was started on levothyroxine replacement therapy with 25 mcg daily.  He used it for only 1 month and stopped it and he rechecked his TSH 5 months later which has not changed.  He denied symptoms of hyperthyroidism such as anxiety, agitation, diarrhea, unintentional weight loss, heat intolerance.      Vitamin D deficiency  This is a chronic controlled problem.   His vitamin D level was 15 in November 2017 and we started him on the high-dose replacement ergocalciferol 50,000 international units weekly.  He took it for 12 weeks to switch over to over-the-counter vitamin D supplements for about 2000 international units daily.  We will be rechecking his vitamin D level.      Mixed dyslipidemia  This is a chronic controlled problem.   We discussed about the importance of lifestyle modifications, increasing the green leafy vegetables with his low HDL level.  We will be repeating his lipid panel.      Patient Active Problem List    Diagnosis Date Noted   • Acquired hypothyroidism 12/01/2017   • Vitamin D deficiency  12/01/2017   • Low HDL (under 40) 07/22/2017   • Dizziness 07/19/2017   • Mixed dyslipidemia 06/15/2017   • Migraine with aura and without status migrainosus, not intractable 05/22/2017       Current Outpatient Prescriptions   Medication Sig Dispense Refill   • levothyroxine (SYNTHROID) 25 MCG Tab Take 1 Tab by mouth Every morning on an empty stomach. 30 Tab 6   • sumatriptan (IMITREX) 50 MG Tab Take 1 Tab by mouth Once PRN for Migraine for up to 1 dose. Can take second dose if no resolution after 2 hours. 10 Tab 3     No current facility-administered medications for this visit.        Allergies as of 06/08/2018 - Reviewed 06/08/2018   Allergen Reaction Noted   • Contrast media with iodine [iodine] Swelling 05/22/2017        Past Medical History:   Diagnosis Date   • Acquired hypothyroidism 12/1/2017    TSH mildly elevated Nov 2017.    • Vertigo    • Vitamin D deficiency 12/1/2017    Vit D level 15 Nov 2017.       Past Surgical History:   Procedure Laterality Date   • EYE SURGERY Left 1998    Lasik   • VASECTOMY  2011?       Social History   Substance Use Topics   • Smoking status: Never Smoker   • Smokeless tobacco: Never Used   • Alcohol use 1.2 oz/week     2 Cans of beer per week       Family History   Problem Relation Age of Onset   • No Known Problems Mother    • No Known Problems Father    • No Known Problems Sister    • Dementia Paternal Grandfather    • Cancer Neg Hx        ROS:     - Constitutional: Negative for fever, chills, unexpected weight change, and fatigue/generalized weakness.     - HEENT: Negative for headaches, vision changes, hearing changes, ear pain, ear discharge, sinus congestion, or sore throat.     - Respiratory: Negative for cough, sputum production, dyspnea and wheezing.    - Cardiovascular: Negative for chest pain or palpitations.      - Gastrointestinal: Negative for heartburn, nausea, vomiting, abdominal pain, diarrhea or constipation.     - Genitourinary: Negative for dysuria,  "polyuria or urinary urgency.    - Musculoskeletal: Negative for myalgias, back pain, and joint pain.     - Skin: Negative for rash, itching, cyanotic skin color change.     - Psychiatric/Behavioral: Negative for depression or suicidal/homicidal ideation.       Physical Exam:     Blood pressure 110/64, pulse 60, temperature 36.9 °C (98.5 °F), resp. rate 16, height 1.803 m (5' 11\"), weight 78.3 kg (172 lb 9.6 oz), SpO2 96 %. Body mass index is 24.07 kg/m².   Gen:         Alert and oriented, No apparent distress.  Neck:        No Lymphadenopathy or Bruits.  Lungs:     Clear to auscultation bilaterally  CV:          Regular rate and rhythm. No murmurs, rubs or gallops.               Ext:          No clubbing, cyanosis, edema.    Data:     .LABS: 2017: Results reviewed and discussed with the patient, questions answered.  Imaging: MRI brain results reviewed and discussed with the patient, questions answered.      Assessment and Plan:     42 y.o. male with the following issues.    1. Migraine with aura and without status migrainosus, not intractable  This is a chronic controlled problem.   Continue with Imitrex as needed.  MRI results were normal.    2. Acquired hypothyroidism  This is a new problem.  Symptoms of hair loss, will start on Synthroid 25 mcg daily for 6 weeks and recheck TSH.    - TSH; Future  - levothyroxine (SYNTHROID) 25 MCG Tab; Take 1 Tab by mouth Every morning on an empty stomach.  Dispense: 30 Tab; Refill: 6    3. Vitamin D deficiency  This is a chronic controlled problem.   Finish the high-dose vitamin D replacement for 12 weeks, will recheck his vitamin D level.  Continue over-the-counter for now.  - VITAMIN D,25 HYDROXY; Future    4. Mixed dyslipidemia  This is a chronic controlled problem.   Discussed about lifestyle modifications.  Healthy diet.  Will recheck lipid panel.  - LIPID PROFILE; Future  - CBC WITH DIFFERENTIAL; Future  - COMP METABOLIC PANEL; Future           Health Maintenance: "   Completed.    There are no preventive care reminders to display for this patient.    Follow Up:      Return in about 3 months (around 9/8/2018) for Thyroid, DLD, Vit D.      Please note that this dictation was created using voice recognition software. I have made every reasonable attempt to correct obvious errors, but I expect that there are errors of grammar and possibly content that I did not discover before finalizing the note.    Signed by: Regi Alcantara M.D.

## 2018-06-08 NOTE — PROGRESS NOTES
Chief Complaint   Patient presents with   • Thyroid Problem   • Dizziness   • Results     lab results   • Hirsutism       Subjective:     History of Present Illness: Patient is a 42 y.o. male. This pleasant patient is here today to establish care with a new primary care provider and address medical problems listed below.***    Migraine with aura and without status migrainosus, not intractable  This is a chronic controlled problem.   He stated that the frequency of his migraine is becoming very sporadic, he almost got one episode over the last 1 year.  We were concerned about the change in the pattern of his migraine last time we ordered an MRI of his brain.  We reviewed his MRI together and it came back normal.  He decided not to meet with a neurologist.  He still keeps a prescription for Imitrex but has not used it recently.        Acquired hypothyroidism  New to me, chronic uncontrolled.  He complains about feeling really slow, his hair is falling, dry skin.  He denies any constipation, weight gain.  We reviewed his TSH from November 2017 which was high, he was started on levothyroxine replacement therapy with 25 mcg daily.  He used it for only 1 month and stopped it and he rechecked his TSH 5 months later which has not changed.  He denied symptoms of hyperthyroidism such as anxiety, agitation, diarrhea, unintentional weight loss, heat intolerance.      Vitamin D deficiency  This is a chronic controlled problem.   His vitamin D level was 15 in November 2017 and we started him on the high-dose replacement ergocalciferol 50,000 international units weekly.  He took it for 12 weeks to switch over to over-the-counter vitamin D supplements for about 2000 international units daily.  We will be rechecking his vitamin D level.      Mixed dyslipidemia  This is a chronic controlled problem.   We discussed about the importance of lifestyle modifications, increasing the green leafy vegetables with his low HDL level.  We will  be repeating his lipid panel.      Allergies: Contrast media with iodine [iodine]    Current Outpatient Prescriptions Ordered in [facility]   Medication Sig Dispense Refill   • levothyroxine (SYNTHROID) 25 MCG Tab Take 1 Tab by mouth Every morning on an empty stomach. 30 Tab 6   • sumatriptan (IMITREX) 50 MG Tab Take 1 Tab by mouth Once PRN for Migraine for up to 1 dose. Can take second dose if no resolution after 2 hours. 10 Tab 3     No current Epic-ordered facility-administered medications on file.        Past Medical History:   Diagnosis Date   • Acquired hypothyroidism 12/1/2017    TSH mildly elevated Nov 2017.    • Vertigo    • Vitamin D deficiency 12/1/2017    Vit D level 15 Nov 2017.       Past Surgical History:   Procedure Laterality Date   • EYE SURGERY Left 1998    Lasik   • VASECTOMY  2011?       Social History   Substance Use Topics   • Smoking status: Never Smoker   • Smokeless tobacco: Never Used   • Alcohol use 1.2 oz/week     2 Cans of beer per week       Family History   Problem Relation Age of Onset   • No Known Problems Mother    • No Known Problems Father    • No Known Problems Sister    • Dementia Paternal Grandfather    • Cancer Neg Hx        ROS: ***    - Constitutional: Negative for fever, chills, unexpected weight change, and fatigue/generalized weakness.     - HEENT: Negative for headaches, vision changes, hearing changes, ear pain, ear discharge, sinus congestion, sore throat, and neck pain.      - Respiratory: Negative for cough, sputum production, dyspnea and wheezing.    - Cardiovascular: Negative for chest pain, palpitations, orthopnea, and bilateral lower extremity edema.     - Gastrointestinal: Negative for heartburn, nausea, vomiting, abdominal pain, hematochezia, melena, diarrhea, constipation, and greasy/foul-smelling stools.     - Genitourinary: Negative for dysuria, polyuria, hematuria, pyuria, urinary urgency, and urinary incontinence.    - Musculoskeletal: Negative for myalgias,  "back pain, and joint pain.     - Skin: Negative for rash, itching, cyanotic skin color change.     - Neurological: Negative for dizziness, tingling, tremors, focal sensory deficit, focal weakness and headaches.     - Endo/Heme/Allergies: Does not bruise/bleed easily.     - Psychiatric/Behavioral: Negative for depression, suicidal/homicidal ideation and memory loss.      {ROSALLOTHERSNE}       Physical Exam:     Blood pressure 110/64, pulse 60, temperature 36.9 °C (98.5 °F), resp. rate 16, height 1.803 m (5' 11\"), weight 78.3 kg (172 lb 9.6 oz), SpO2 96 %. Body mass index is 24.07 kg/m².   General: Normal appearing. No distress.***  HEENT: Normocephalic. Eyes conjunctiva clear lids without ptosis, pupils equal and reactive to light accommodation, ears normal shape and contour, canals are clear bilaterally, tympanic membranes are benign,  oropharynx is without erythema, edema or exudates.    Neck: Supple without JVD or bruit. Thyroid is not enlarged.  Pulmonary: Clear to ausculation.  Normal effort. No rales, ronchi, or wheezing.  Cardiovascular: Regular rate and rhythm without murmur. Radial pulses are intact, regular and symmetrical bilaterally.  Abdomen: Soft, nontender, nondistended. Normal bowel sounds. Liver and spleen are not palpable.  Neurologic: Grossly non-focal.  Lymph: No cervical, occipital or supraclavicular lymph nodes are palpable  Skin: Warm and dry.  No obvious lesions.  Musculoskeletal: Normal gait. No extremity cyanosis, clubbing, or edema.  Psych: Normal mood and affect. Alert and oriented x3. Judgment and insight is normal.    Data:     ***    Assessment and Plan:     1. Migraine with aura and without status migrainosus, not intractable  ***    2. Acquired hypothyroidism  ***  - TSH; Future  - levothyroxine (SYNTHROID) 25 MCG Tab; Take 1 Tab by mouth Every morning on an empty stomach.  Dispense: 30 Tab; Refill: 6    3. Vitamin D deficiency  ***  - VITAMIN D,25 HYDROXY; Future    4. Mixed " dyslipidemia  ***  - LIPID PROFILE; Future  - CBC WITH DIFFERENTIAL; Future  - COMP METABOLIC PANEL; Future      Health Maintenance:      {COMPLETED:068046}  There are no preventive care reminders to display for this patient.    Follow Up:      Return in about 3 months (around 9/8/2018) for Thyroid, DLD, Vit D.    Please note that this dictation was created using voice recognition software. I have made every reasonable attempt to correct obvious errors, but I expect that there are errors of grammar and possibly content that I did not discover before finalizing the note.    Signed by: Regi Alcantara M.D.

## 2018-06-08 NOTE — ASSESSMENT & PLAN NOTE
This is a chronic controlled problem.   We discussed about the importance of lifestyle modifications, increasing the green leafy vegetables with his low HDL level.  We will be repeating his lipid panel.

## 2018-06-08 NOTE — ASSESSMENT & PLAN NOTE
This is a chronic controlled problem.   He stated that the frequency of his migraine is becoming very sporadic, he almost got one episode over the last 1 year.  We were concerned about the change in the pattern of his migraine last time we ordered an MRI of his brain.  We reviewed his MRI together and it came back normal.  He decided not to meet with a neurologist.  He still keeps a prescription for Imitrex but has not used it recently.

## 2018-06-08 NOTE — ASSESSMENT & PLAN NOTE
This is a chronic controlled problem.   His vitamin D level was 15 in November 2017 and we started him on the high-dose replacement ergocalciferol 50,000 international units weekly.  He took it for 12 weeks to switch over to over-the-counter vitamin D supplements for about 2000 international units daily.  We will be rechecking his vitamin D level.

## 2018-06-08 NOTE — ASSESSMENT & PLAN NOTE
New to me, chronic uncontrolled.  He complains about feeling really slow, his hair is falling, dry skin.  He denies any constipation, weight gain.  We reviewed his TSH from November 2017 which was high, he was started on levothyroxine replacement therapy with 25 mcg daily.  He used it for only 1 month and stopped it and he rechecked his TSH 5 months later which has not changed.  He denied symptoms of hyperthyroidism such as anxiety, agitation, diarrhea, unintentional weight loss, heat intolerance.

## 2018-11-23 ENCOUNTER — HOSPITAL ENCOUNTER (OUTPATIENT)
Dept: LAB | Facility: MEDICAL CENTER | Age: 43
End: 2018-11-23
Attending: INTERNAL MEDICINE
Payer: COMMERCIAL

## 2018-11-23 LAB
25(OH)D3 SERPL-MCNC: 21 NG/ML (ref 30–100)
ALBUMIN SERPL BCP-MCNC: 4.5 G/DL (ref 3.2–4.9)
ALBUMIN/GLOB SERPL: 1.7 G/DL
ALP SERPL-CCNC: 46 U/L (ref 30–99)
ALT SERPL-CCNC: 18 U/L (ref 2–50)
ANION GAP SERPL CALC-SCNC: 8 MMOL/L (ref 0–11.9)
AST SERPL-CCNC: 17 U/L (ref 12–45)
BASOPHILS # BLD AUTO: 0.6 % (ref 0–1.8)
BASOPHILS # BLD: 0.03 K/UL (ref 0–0.12)
BILIRUB SERPL-MCNC: 0.6 MG/DL (ref 0.1–1.5)
BUN SERPL-MCNC: 14 MG/DL (ref 8–22)
CALCIUM SERPL-MCNC: 9.6 MG/DL (ref 8.5–10.5)
CHLORIDE SERPL-SCNC: 107 MMOL/L (ref 96–112)
CHOLEST SERPL-MCNC: 180 MG/DL (ref 100–199)
CO2 SERPL-SCNC: 24 MMOL/L (ref 20–33)
CREAT SERPL-MCNC: 1.01 MG/DL (ref 0.5–1.4)
EOSINOPHIL # BLD AUTO: 0.17 K/UL (ref 0–0.51)
EOSINOPHIL NFR BLD: 3.4 % (ref 0–6.9)
ERYTHROCYTE [DISTWIDTH] IN BLOOD BY AUTOMATED COUNT: 37 FL (ref 35.9–50)
FASTING STATUS PATIENT QL REPORTED: NORMAL
GLOBULIN SER CALC-MCNC: 2.7 G/DL (ref 1.9–3.5)
GLUCOSE SERPL-MCNC: 91 MG/DL (ref 65–99)
HCT VFR BLD AUTO: 47.2 % (ref 42–52)
HDLC SERPL-MCNC: 42 MG/DL
HGB BLD-MCNC: 16.8 G/DL (ref 14–18)
IMM GRANULOCYTES # BLD AUTO: 0.01 K/UL (ref 0–0.11)
IMM GRANULOCYTES NFR BLD AUTO: 0.2 % (ref 0–0.9)
LDLC SERPL CALC-MCNC: 119 MG/DL
LYMPHOCYTES # BLD AUTO: 2.09 K/UL (ref 1–4.8)
LYMPHOCYTES NFR BLD: 41.7 % (ref 22–41)
MCH RBC QN AUTO: 31.1 PG (ref 27–33)
MCHC RBC AUTO-ENTMCNC: 35.6 G/DL (ref 33.7–35.3)
MCV RBC AUTO: 87.2 FL (ref 81.4–97.8)
MONOCYTES # BLD AUTO: 0.53 K/UL (ref 0–0.85)
MONOCYTES NFR BLD AUTO: 10.6 % (ref 0–13.4)
NEUTROPHILS # BLD AUTO: 2.18 K/UL (ref 1.82–7.42)
NEUTROPHILS NFR BLD: 43.5 % (ref 44–72)
NRBC # BLD AUTO: 0 K/UL
NRBC BLD-RTO: 0 /100 WBC
PLATELET # BLD AUTO: 229 K/UL (ref 164–446)
PMV BLD AUTO: 10.3 FL (ref 9–12.9)
POTASSIUM SERPL-SCNC: 4.1 MMOL/L (ref 3.6–5.5)
PROT SERPL-MCNC: 7.2 G/DL (ref 6–8.2)
RBC # BLD AUTO: 5.41 M/UL (ref 4.7–6.1)
SODIUM SERPL-SCNC: 139 MMOL/L (ref 135–145)
TRIGL SERPL-MCNC: 94 MG/DL (ref 0–149)
TSH SERPL DL<=0.005 MIU/L-ACNC: 2.04 UIU/ML (ref 0.38–5.33)
WBC # BLD AUTO: 5 K/UL (ref 4.8–10.8)

## 2018-11-23 PROCEDURE — 85025 COMPLETE CBC W/AUTO DIFF WBC: CPT

## 2018-11-23 PROCEDURE — 80053 COMPREHEN METABOLIC PANEL: CPT

## 2018-11-23 PROCEDURE — 36415 COLL VENOUS BLD VENIPUNCTURE: CPT

## 2018-11-23 PROCEDURE — 80061 LIPID PANEL: CPT

## 2018-11-23 PROCEDURE — 84443 ASSAY THYROID STIM HORMONE: CPT

## 2018-11-23 PROCEDURE — 82306 VITAMIN D 25 HYDROXY: CPT

## 2018-12-17 ENCOUNTER — OFFICE VISIT (OUTPATIENT)
Dept: MEDICAL GROUP | Facility: LAB | Age: 43
End: 2018-12-17
Payer: COMMERCIAL

## 2018-12-17 VITALS
WEIGHT: 170.2 LBS | TEMPERATURE: 98.1 F | RESPIRATION RATE: 16 BRPM | BODY MASS INDEX: 23.83 KG/M2 | DIASTOLIC BLOOD PRESSURE: 70 MMHG | OXYGEN SATURATION: 95 % | HEIGHT: 71 IN | HEART RATE: 68 BPM | SYSTOLIC BLOOD PRESSURE: 118 MMHG

## 2018-12-17 DIAGNOSIS — E55.9 VITAMIN D DEFICIENCY: ICD-10-CM

## 2018-12-17 DIAGNOSIS — Z23 NEED FOR VACCINATION: ICD-10-CM

## 2018-12-17 DIAGNOSIS — G43.109 MIGRAINE WITH AURA AND WITHOUT STATUS MIGRAINOSUS, NOT INTRACTABLE: ICD-10-CM

## 2018-12-17 DIAGNOSIS — E78.2 MIXED DYSLIPIDEMIA: ICD-10-CM

## 2018-12-17 DIAGNOSIS — L65.9 HAIR LOSS: ICD-10-CM

## 2018-12-17 DIAGNOSIS — E03.9 ACQUIRED HYPOTHYROIDISM: ICD-10-CM

## 2018-12-17 PROCEDURE — 90715 TDAP VACCINE 7 YRS/> IM: CPT | Performed by: INTERNAL MEDICINE

## 2018-12-17 PROCEDURE — 99214 OFFICE O/P EST MOD 30 MIN: CPT | Mod: 25 | Performed by: INTERNAL MEDICINE

## 2018-12-17 PROCEDURE — 90471 IMMUNIZATION ADMIN: CPT | Performed by: INTERNAL MEDICINE

## 2018-12-17 RX ORDER — LEVOTHYROXINE SODIUM 0.03 MG/1
25 TABLET ORAL
Qty: 90 TAB | Refills: 3 | Status: SHIPPED | OUTPATIENT
Start: 2018-12-17 | End: 2019-03-17

## 2018-12-17 RX ORDER — ERGOCALCIFEROL 1.25 MG/1
50000 CAPSULE ORAL
Qty: 12 CAP | Refills: 1 | Status: SHIPPED | OUTPATIENT
Start: 2018-12-17 | End: 2021-03-15

## 2018-12-17 RX ORDER — SUMATRIPTAN 50 MG/1
50 TABLET, FILM COATED ORAL
Qty: 10 TAB | Refills: 3 | Status: SHIPPED | OUTPATIENT
Start: 2018-12-17 | End: 2021-03-15

## 2018-12-17 NOTE — ASSESSMENT & PLAN NOTE
This is a chronic uncontrolled problem.   He was on high-dose replacement but he does not remember for how long he took them.  His level improved from 15 in November 2017 to 27 in November 2018.  He is currently taking Centrum over-the-counter

## 2018-12-17 NOTE — ASSESSMENT & PLAN NOTE
Note to discuss, uncontrolled problem.  He stated that he has been remarkably losing a lot of hair over the last 1 year.  His thyroid function normalized after started levothyroxine replacement and has been taking over-the-counter biotin with 5000 units daily without improvement.  He is not sure about his family pattern because he is maternal grand father  with a car accident at the age of 41.  He does not have maternal uncles.  He is scheduled to see a dermatologist next week.

## 2018-12-17 NOTE — ASSESSMENT & PLAN NOTE
This is a chronic uncontrolled problem.   We discussed the results of his most recent lipid panel and his LDL is still not at goal.  He told me that he is a busy father and he has not been exercising on a regular basis.  Lab Results   Component Value Date/Time    CHOLSTRLTOT 180 11/23/2018 09:55 AM    CHOLSTRLTOT 179 05/24/2017 08:07 AM     (H) 11/23/2018 09:55 AM     (H) 05/24/2017 08:07 AM    HDL 42 11/23/2018 09:55 AM    HDL 39 (A) 05/24/2017 08:07 AM    TRIGLYCERIDE 94 11/23/2018 09:55 AM    TRIGLYCERIDE 89 05/24/2017 08:07 AM

## 2018-12-17 NOTE — ASSESSMENT & PLAN NOTE
This is a chronic controlled problem.   He has not had an episode for the last 3 months.  His most recent episode was controlled with Imitrex but he believes that it reduced the intensity of his pain but it extended hours from 3-8 hours of pain.  He had a normal MRI brain 2017.  Has not tRied using ibuprofen.

## 2018-12-17 NOTE — PROGRESS NOTES
Chief Complaint   Patient presents with   • Immunizations   • Results     Lab results       Subjective:     HPI:   Pj presents today with the following.    Migraine with aura and without status migrainosus, not intractable  This is a chronic controlled problem.   He has not had an episode for the last 3 months.  His most recent episode was controlled with Imitrex but he believes that it reduced the intensity of his pain but it extended hours from 3-8 hours of pain.  He had a normal MRI brain .  Has not tRied using ibuprofen.        Acquired hypothyroidism  This is a chronic controlled problem.   TSH mildly elevated 2017. Normalized  on levothyroxine 25 mg.  Seems to be clinically euthyroid, feeling well with good energy level.  Denied bowel habits change such as diarrhea/constipation, denies temperature intolerance.  Reported hair loss discussed below.    Hair loss  Note to discuss, uncontrolled problem.  He stated that he has been remarkably losing a lot of hair over the last 1 year.  His thyroid function normalized after started levothyroxine replacement and has been taking over-the-counter biotin with 5000 units daily without improvement.  He is not sure about his family pattern because he is maternal grand father  with a car accident at the age of 41.  He does not have maternal uncles.  He is scheduled to see a dermatologist next week.        Mixed dyslipidemia  This is a chronic uncontrolled problem.   We discussed the results of his most recent lipid panel and his LDL is still not at goal.  He told me that he is a busy father and he has not been exercising on a regular basis.  Lab Results   Component Value Date/Time    CHOLSTRLTOT 180 2018 09:55 AM    CHOLSTRLTOT 179 2017 08:07 AM     (H) 2018 09:55 AM     (H) 2017 08:07 AM    HDL 42 2018 09:55 AM    HDL 39 (A) 2017 08:07 AM    TRIGLYCERIDE 94 2018 09:55 AM    TRIGLYCERIDE 89 2017  08:07 AM           Vitamin D deficiency  This is a chronic uncontrolled problem.   He was on high-dose replacement but he does not remember for how long he took them.  His level improved from 15 in November 2017 to 27 in November 2018.  He is currently taking Centrum over-the-counter      Patient Active Problem List    Diagnosis Date Noted   • Hair loss 12/17/2018   • Acquired hypothyroidism 12/01/2017   • Vitamin D deficiency 12/01/2017   • Low HDL (under 40) 07/22/2017   • Dizziness 07/19/2017   • Mixed dyslipidemia 06/15/2017   • Migraine with aura and without status migrainosus, not intractable 05/22/2017       Current Outpatient Prescriptions   Medication Sig Dispense Refill   • SUMAtriptan (IMITREX) 50 MG Tab Take 1 Tab by mouth Once PRN for Migraine for up to 1 dose. Can take second dose if no resolution after 2 hours. 10 Tab 3   • vitamin D, Ergocalciferol, (DRISDOL) 05990 units Cap capsule Take 1 Cap by mouth every 7 days. 12 Cap 1   • levothyroxine (SYNTHROID) 25 MCG Tab Take 1 Tab by mouth Every morning on an empty stomach for 90 days. 90 Tab 3     No current facility-administered medications for this visit.        Allergies as of 12/17/2018 - Reviewed 12/17/2018   Allergen Reaction Noted   • Contrast media with iodine [iodine] Swelling 05/22/2017        Past Medical History:   Diagnosis Date   • Acquired hypothyroidism 12/1/2017    TSH mildly elevated Nov 2017.    • Vertigo    • Vitamin D deficiency 12/1/2017    Vit D level 15 Nov 2017.       Past Surgical History:   Procedure Laterality Date   • EYE SURGERY Left 1998    Lasik   • VASECTOMY  2011?       Social History   Substance Use Topics   • Smoking status: Never Smoker   • Smokeless tobacco: Never Used   • Alcohol use 1.2 oz/week     2 Cans of beer per week       Family History   Problem Relation Age of Onset   • No Known Problems Mother    • No Known Problems Father    • No Known Problems Sister    • Dementia Paternal Grandfather    • Cancer Neg Hx   "      ROS:     - Constitutional: Negative for fever, chills, unexpected weight change, and fatigue/generalized weakness.     - HEENT: Negative for headaches, vision changes, hearing changes, ear pain, ear discharge, sinus congestion, or sore throat.     - Respiratory: Negative for cough, sputum production, dyspnea and wheezing.    - Cardiovascular: Negative for chest pain or palpitations.      - Gastrointestinal: Negative for heartburn, nausea, vomiting, abdominal pain, diarrhea or constipation.     - Genitourinary: Negative for dysuria, polyuria or urinary urgency.    - Musculoskeletal: Negative for myalgias, back pain, and joint pain.     - Skin: + Hair loss. Negative for rash, itching, cyanotic skin color change.     - Psychiatric/Behavioral: Negative for depression or suicidal/homicidal ideation.       Physical Exam:     Blood pressure 118/70, pulse 68, temperature 36.7 °C (98.1 °F), temperature source Temporal, resp. rate 16, height 1.803 m (5' 11\"), weight 77.2 kg (170 lb 3.2 oz), SpO2 95 %. Body mass index is 23.74 kg/m².   Gen:         Alert and oriented, No apparent distress.  Neck:        No Lymphadenopathy or Bruits.  Lungs:     Clear to auscultation bilaterally  CV:          Regular rate and rhythm. No murmurs, rubs or gallops.        Ext:          No clubbing, cyanosis, edema.    Data:     LABS: November 2018 CBC, CMP, lipid panel, TSH, vitamin D: Results reviewed and discussed with the patient, questions answered.      Assessment and Plan:     43 y.o. male with the following issues.    1. Acquired hypothyroidism  This is a chronic controlled problem.   TSH is now normal, continue levothyroxine 25 mcg daily.  Patient is compliant.    - levothyroxine (SYNTHROID) 25 MCG Tab; Take 1 Tab by mouth Every morning on an empty stomach for 90 days.  Dispense: 90 Tab; Refill: 3  - TSH WITH REFLEX TO FT4; Future    2. Vitamin D deficiency  This is a chronic uncontrolled problem.   We will do 3 more months of " high-dose replacement as below and to switch to over-the-counter supplements.  He will recheck his lab before his next visit with Dr. Best to establish care.    - vitamin D, Ergocalciferol, (DRISDOL) 97452 units Cap capsule; Take 1 Cap by mouth every 7 days.  Dispense: 12 Cap; Refill: 1  - VITAMIN D,25 HYDROXY; Future    3. Mixed dyslipidemia  This is a chronic uncontrolled problem.   LDL is still not at goal, discussed various lifestyle medications.  Overall ASCVD risk is low, not statin candidate.  Given an order to repeat lipid panel and follow-up results with Dr. Best new PCP.  - Lipid Profile; Future    4. Migraine with aura and without status migrainosus, not intractable  This is a chronic controlled problem.   Continue as needed Imitrex combined with ibuprofen 600-800 per episode.  - SUMAtriptan (IMITREX) 50 MG Tab; Take 1 Tab by mouth Once PRN for Migraine for up to 1 dose. Can take second dose if no resolution after 2 hours.  Dispense: 10 Tab; Refill: 3    5. Hair loss  This is a chronic uncontrolled problem.   Will be evaluated by dermatologist next week.  Normal thyroid function, has not improved after a trial of Biotene.    6. Need for vaccination  Will be given today in the office.  - Tdap Vaccine =>8YO IM           Health Maintenance:   Completed.    Health Maintenance Due   Topic   • IMM DTaP/Tdap/Td Vaccine (2 - Td)   • IMM INFLUENZA (1)       Follow Up:      Return in about 6 months (around 6/17/2019) for Lab and est Dr. Best.      Please note that this dictation was created using voice recognition software. I have made every reasonable attempt to correct obvious errors, but I expect that there are errors of grammar and possibly content that I did not discover before finalizing the note.    Signed by: Regi Alcantara M.D.

## 2018-12-17 NOTE — ASSESSMENT & PLAN NOTE
This is a chronic controlled problem.   TSH mildly elevated Nov 2017. Normalized 2018 on levothyroxine 25 mg.  Seems to be clinically euthyroid, feeling well with good energy level.  Denied bowel habits change such as diarrhea/constipation, denies temperature intolerance.  Reported hair loss discussed below.

## 2018-12-20 ENCOUNTER — APPOINTMENT (RX ONLY)
Dept: URBAN - METROPOLITAN AREA CLINIC 22 | Facility: CLINIC | Age: 43
Setting detail: DERMATOLOGY
End: 2018-12-20

## 2018-12-20 DIAGNOSIS — D22 MELANOCYTIC NEVI: ICD-10-CM

## 2018-12-20 DIAGNOSIS — Z71.89 OTHER SPECIFIED COUNSELING: ICD-10-CM

## 2018-12-20 DIAGNOSIS — L81.4 OTHER MELANIN HYPERPIGMENTATION: ICD-10-CM

## 2018-12-20 DIAGNOSIS — L64.8 OTHER ANDROGENIC ALOPECIA: ICD-10-CM

## 2018-12-20 PROBLEM — D48.5 NEOPLASM OF UNCERTAIN BEHAVIOR OF SKIN: Status: ACTIVE | Noted: 2018-12-20

## 2018-12-20 PROBLEM — D22.62 MELANOCYTIC NEVI OF LEFT UPPER LIMB, INCLUDING SHOULDER: Status: ACTIVE | Noted: 2018-12-20

## 2018-12-20 PROBLEM — E03.9 HYPOTHYROIDISM, UNSPECIFIED: Status: ACTIVE | Noted: 2018-12-20

## 2018-12-20 PROBLEM — D22.5 MELANOCYTIC NEVI OF TRUNK: Status: ACTIVE | Noted: 2018-12-20

## 2018-12-20 PROCEDURE — 99203 OFFICE O/P NEW LOW 30 MIN: CPT | Mod: 25

## 2018-12-20 PROCEDURE — ? COUNSELING

## 2018-12-20 PROCEDURE — ? PRESCRIPTION

## 2018-12-20 PROCEDURE — ? BIOPSY BY SHAVE METHOD

## 2018-12-20 PROCEDURE — 11100: CPT

## 2018-12-20 RX ORDER — FINASTERIDE 1 MG/1
TABLET, FILM COATED ORAL DAILY
Qty: 30 | Refills: 11 | Status: ERX | COMMUNITY
Start: 2018-12-20

## 2018-12-20 RX ADMIN — FINASTERIDE: 1 TABLET, FILM COATED ORAL at 21:01

## 2018-12-20 ASSESSMENT — LOCATION DETAILED DESCRIPTION DERM
LOCATION DETAILED: EPIGASTRIC SKIN
LOCATION DETAILED: LEFT ANTERIOR SHOULDER
LOCATION DETAILED: INFERIOR LUMBAR SPINE
LOCATION DETAILED: HAIR

## 2018-12-20 ASSESSMENT — LOCATION ZONE DERM
LOCATION ZONE: SCALP
LOCATION ZONE: ARM
LOCATION ZONE: TRUNK

## 2018-12-20 ASSESSMENT — LOCATION SIMPLE DESCRIPTION DERM
LOCATION SIMPLE: LEFT SHOULDER
LOCATION SIMPLE: LOWER BACK
LOCATION SIMPLE: HAIR
LOCATION SIMPLE: ABDOMEN

## 2018-12-20 NOTE — PROCEDURE: BIOPSY BY SHAVE METHOD
Notification Instructions: Patient will be notified of biopsy results. However, patient instructed to call the office if not contacted within 2 weeks.
Was A Bandage Applied: Yes
Hemostasis: Drysol
Silver Nitrate Text: The wound bed was treated with silver nitrate after the biopsy was performed.
Curettage Text: The wound bed was treated with curettage after the biopsy was performed.
Bill For Surgical Tray: no
Biopsy Method: Personna blade
Consent: Written consent was obtained and risks were reviewed including but not limited to scarring, infection, bleeding, scabbing, incomplete removal, nerve damage and allergy to anesthesia.
Detail Level: Detailed
Lab: [id number]
Size Of Lesion In Cm: 0
Lab Facility: 
Wound Care: Vaseline
Cryotherapy Text: The wound bed was treated with cryotherapy after the biopsy was performed.
Anesthesia Type: 1% lidocaine with 1:100,000 epinephrine and a 1:3 solution of 8.4% sodium bicarbonate
Biopsy Type: H and E
Billing Type: Third-Party Bill
Depth Of Biopsy: dermis
Electrodesiccation Text: The wound bed was treated with electrodesiccation after the biopsy was performed.
Electrodesiccation And Curettage Text: The wound bed was treated with electrodesiccation and curettage after the biopsy was performed.
Type Of Destruction Used: Curettage
Anesthesia Volume In Cc: 1
Dressing: bandage
Post-Care Instructions: I reviewed with the patient in detail post-care instructions. Patient is to keep the biopsy site dry overnight, and then apply bacitracin twice daily until healed. Patient may apply hydrogen peroxide soaks to remove any crusting.

## 2019-06-28 ENCOUNTER — APPOINTMENT (RX ONLY)
Dept: URBAN - METROPOLITAN AREA CLINIC 4 | Facility: CLINIC | Age: 44
Setting detail: DERMATOLOGY
End: 2019-06-28

## 2019-06-28 DIAGNOSIS — D22 MELANOCYTIC NEVI: ICD-10-CM

## 2019-06-28 PROBLEM — D22.5 MELANOCYTIC NEVI OF TRUNK: Status: ACTIVE | Noted: 2019-06-28

## 2019-06-28 PROCEDURE — ? BIOPSY BY SHAVE METHOD

## 2019-06-28 PROCEDURE — 11102 TANGNTL BX SKIN SINGLE LES: CPT

## 2019-06-28 ASSESSMENT — LOCATION DETAILED DESCRIPTION DERM: LOCATION DETAILED: RIGHT INFERIOR UPPER BACK

## 2019-06-28 ASSESSMENT — LOCATION SIMPLE DESCRIPTION DERM: LOCATION SIMPLE: RIGHT UPPER BACK

## 2019-06-28 ASSESSMENT — LOCATION ZONE DERM: LOCATION ZONE: TRUNK

## 2019-06-28 NOTE — PROCEDURE: BIOPSY BY SHAVE METHOD
Bill For Surgical Tray: no
Notification Instructions: Patient will be notified of biopsy results. However, patient instructed to call the office if not contacted within 2 weeks.
Electrodesiccation Text: The wound bed was treated with electrodesiccation after the biopsy was performed.
Depth Of Biopsy: dermis
Lab Facility: 
Size Of Lesion In Cm: 0.5
Cryotherapy Text: The wound bed was treated with cryotherapy after the biopsy was performed.
Additional Anesthesia Volume In Cc (Will Not Render If 0): 0
Wound Care: Vaseline
Dressing: bandage
Electrodesiccation And Curettage Text: The wound bed was treated with electrodesiccation and curettage after the biopsy was performed.
Type Of Destruction Used: Electrodesiccation
Consent: Written consent was obtained and risks were reviewed including but not limited to scarring, infection, bleeding, scabbing, incomplete removal, nerve damage and allergy to anesthesia.
Biopsy Type: H and E
Hemostasis: Electrocautery
Was A Bandage Applied: Yes
Silver Nitrate Text: The wound bed was treated with silver nitrate after the biopsy was performed.
Detail Level: Detailed
Post-Care Instructions: I reviewed with the patient in detail post-care instructions. Patient is to keep the biopsy site dry overnight, and then apply bacitracin twice daily until healed. Patient may apply hydrogen peroxide soaks to remove any crusting.
Anesthesia Type: 1% lidocaine with epinephrine
Biopsy Method: Personna blade
Billing Type: Third-Party Bill
Lab: [id number]
Curettage Text: The wound bed was treated with curettage after the biopsy was performed.

## 2019-11-08 ENCOUNTER — IMMUNIZATION (OUTPATIENT)
Dept: SOCIAL WORK | Facility: CLINIC | Age: 44
End: 2019-11-08
Payer: COMMERCIAL

## 2019-11-08 DIAGNOSIS — Z23 NEED FOR VACCINATION: ICD-10-CM

## 2019-11-08 PROCEDURE — 90471 IMMUNIZATION ADMIN: CPT | Performed by: REGISTERED NURSE

## 2019-11-08 PROCEDURE — 90686 IIV4 VACC NO PRSV 0.5 ML IM: CPT | Performed by: REGISTERED NURSE

## 2020-11-23 ENCOUNTER — APPOINTMENT (OUTPATIENT)
Dept: LAB | Facility: MEDICAL CENTER | Age: 45
End: 2020-11-23
Payer: COMMERCIAL

## 2021-02-03 ENCOUNTER — TELEPHONE (OUTPATIENT)
Dept: SCHEDULING | Facility: IMAGING CENTER | Age: 46
End: 2021-02-03

## 2021-03-15 ENCOUNTER — TELEMEDICINE (OUTPATIENT)
Dept: MEDICAL GROUP | Facility: LAB | Age: 46
End: 2021-03-15
Payer: COMMERCIAL

## 2021-03-15 VITALS — HEIGHT: 71 IN | BODY MASS INDEX: 23.8 KG/M2 | WEIGHT: 170 LBS

## 2021-03-15 DIAGNOSIS — Z76.89 ENCOUNTER TO ESTABLISH CARE: ICD-10-CM

## 2021-03-15 DIAGNOSIS — Z13.6 SCREENING FOR CARDIOVASCULAR CONDITION: ICD-10-CM

## 2021-03-15 DIAGNOSIS — E03.9 ACQUIRED HYPOTHYROIDISM: ICD-10-CM

## 2021-03-15 DIAGNOSIS — K21.9 GASTROESOPHAGEAL REFLUX DISEASE WITHOUT ESOPHAGITIS: ICD-10-CM

## 2021-03-15 DIAGNOSIS — G43.109 MIGRAINE WITH AURA AND WITHOUT STATUS MIGRAINOSUS, NOT INTRACTABLE: ICD-10-CM

## 2021-03-15 PROCEDURE — 99214 OFFICE O/P EST MOD 30 MIN: CPT | Mod: 95,CR | Performed by: FAMILY MEDICINE

## 2021-03-15 RX ORDER — PANTOPRAZOLE SODIUM 40 MG/1
TABLET, DELAYED RELEASE ORAL
COMMUNITY
Start: 2021-03-11

## 2021-03-15 NOTE — PROGRESS NOTES
Virtual Visit: Established Patient   This visit was conducted via Zoom using secure and encrypted videoconferencing technology. The patient was in a private location in the state of [place].    The patient's identity was confirmed and verbal consent was obtained for this virtual visit.    Subjective:   CC:   Chief Complaint   Patient presents with   • Establish Care   • Gastrophageal Reflux     Follow up, saw GI about 1 month ago, noticed some improvement        Pj Rhodes is a 45 y.o. male presenting for evaluation and management of:    #Establish care   -Reviewed all past medical history, family history, social history.  -Reviewed all screening/vaccinations: Up-to-date on all vaccinations.  Is due for lipid panel screening.  -Diet and Exercise: Working on improving diet, exercise regimen.  Try to work out 2-3 times a week.  -Tobacco, alcohol, recreational drug use: Denies any tobacco, recreational illicit drug use.  Very occasional alcohol use due to GERD.  -Sexually active: Yes, monogamous with female partner, no concerns regarding relationship.  -Occupation: Works as  at [employer]    #GERD  -Being followed by GI, recently diagnosed.   -Is treating with Protonix which he finds has been helping symptoms especially at night.  -Does have Prilosec which she is only taking at dinner as needed.  -Is planning on following up with GI the end this month for EGD.  Has no concerns or questions at this time.    #Migraines  -Longstanding history, variation used to be treating with Imitrex; however, states that migraines are well in control and has had not had to use Imitrex for some time now.  Continue to treat conservatively has no other questions or concerns at this time.    #Hypothyroidism:  -Has a history of relatively elevated TSH back in 2017 which normalized after low dose of levothyroxine.  Patient is no longer taking levothyroxine.  Is asymptomatic at this time request further  "testing.    ROS   Denies any recent fevers or chills. No nausea or vomiting. No chest pains or shortness of breath.     Allergies   Allergen Reactions   • Contrast Media With Iodine [Iodine] Swelling       Current medicines (including changes today)  Current Outpatient Medications   Medication Sig Dispense Refill   • pantoprazole (PROTONIX) 40 MG Tablet Delayed Response        No current facility-administered medications for this visit.       Patient Active Problem List    Diagnosis Date Noted   • Hair loss 12/17/2018   • Acquired hypothyroidism 12/01/2017   • Vitamin D deficiency 12/01/2017   • Low HDL (under 40) 07/22/2017   • Dizziness 07/19/2017   • Mixed dyslipidemia 06/15/2017   • Migraine with aura and without status migrainosus, not intractable 05/22/2017       Family History   Problem Relation Age of Onset   • No Known Problems Mother    • No Known Problems Father    • No Known Problems Sister    • Dementia Paternal Grandfather    • Cancer Neg Hx        He  has a past medical history of Acquired hypothyroidism (12/1/2017), Vertigo, and Vitamin D deficiency (12/1/2017).  He  has a past surgical history that includes eye surgery (Left, 1998) and vasectomy (2011?).       Objective:   Ht 1.803 m (5' 11\")   Wt 77.1 kg (170 lb)   BMI 23.71 kg/m²     Physical Exam:  Constitutional: Alert, no distress, well-groomed.  Skin: No rashes in visible areas.  Eye: Round. Conjunctiva clear, lids normal. No icterus.   ENMT: Lips pink without lesions, good dentition, moist mucous membranes. Phonation normal.  Neck: No masses, no thyromegaly. Moves freely without pain.  Respiratory: Unlabored respiratory effort, no cough or audible wheeze  Psych: Alert and oriented x3, normal affect and mood.       Assessment and Plan:   The following treatment plan was discussed:     1. Encounter to establish care  -All questions concerns were answered at this time.  -Vaccinations/screenings needed at this time: Lipid profile as " below.  -Labs reviewed, no concerns, check labs as below.  -Discussed the importance of a healthy, Mediterranean style diet, routine exercise regimen.  -Discussed general safety measures including seatbelts, helmets, avoidance of smoking, sunscreen, hydration.  -Follow-up for general physical exam on a yearly basis, sooner if needed.  - CBC WITHOUT DIFFERENTIAL; Future  - Comp Metabolic Panel; Future    2. Gastroesophageal reflux disease without esophagitis  -Status: Stable.  We will continue treatment with Protonix.  Discussed conservative treatment measures.  Recommend follow-up with GI at end of this month for endoscopy.    3. Migraine with aura and without status migrainosus, not intractable  -Status: Stable.  We will continue the current conservative treatment measures.  Follow-up as needed.    4. Acquired hypothyroidism  -Status: Stable.  Currently not on any medication but will recheck TSH at this time.  - TSH WITH REFLEX TO FT4; Future    5. Screening for cardiovascular condition  - Lipid Profile; Future      Follow-up: Return in about 1 year (around 3/15/2022).